# Patient Record
Sex: FEMALE | Race: WHITE | NOT HISPANIC OR LATINO | Employment: OTHER | URBAN - METROPOLITAN AREA
[De-identification: names, ages, dates, MRNs, and addresses within clinical notes are randomized per-mention and may not be internally consistent; named-entity substitution may affect disease eponyms.]

---

## 2016-06-21 LAB
CHOLEST SERPL-MSCNC: 212 MG/DL (ref 0–200)
HDLC SERPL-MCNC: 73 MG/DL
HEP C VIRUS AB: <0.1
LDLC SERPL CALC-MCNC: 120 MG/DL
TRIGL SERPL-MCNC: 94 MG/DL

## 2020-08-19 ENCOUNTER — OFFICE VISIT (OUTPATIENT)
Dept: FAMILY MEDICINE | Facility: CLINIC | Age: 55
End: 2020-08-19
Payer: MEDICARE

## 2020-08-19 VITALS
HEIGHT: 65 IN | TEMPERATURE: 97 F | BODY MASS INDEX: 27.47 KG/M2 | RESPIRATION RATE: 15 BRPM | DIASTOLIC BLOOD PRESSURE: 100 MMHG | SYSTOLIC BLOOD PRESSURE: 161 MMHG | WEIGHT: 164.88 LBS | HEART RATE: 83 BPM | OXYGEN SATURATION: 93 %

## 2020-08-19 DIAGNOSIS — D50.8 OTHER IRON DEFICIENCY ANEMIA: Chronic | ICD-10-CM

## 2020-08-19 DIAGNOSIS — Z11.4 ENCOUNTER FOR SCREENING FOR HIV: ICD-10-CM

## 2020-08-19 DIAGNOSIS — Z11.59 ENCOUNTER FOR HEPATITIS C SCREENING TEST FOR LOW RISK PATIENT: ICD-10-CM

## 2020-08-19 DIAGNOSIS — R06.09 DYSPNEA ON EXERTION: Primary | ICD-10-CM

## 2020-08-19 DIAGNOSIS — I10 ESSENTIAL HYPERTENSION: Chronic | ICD-10-CM

## 2020-08-19 DIAGNOSIS — I48.20 ATRIAL FIBRILLATION, CHRONIC: Chronic | ICD-10-CM

## 2020-08-19 DIAGNOSIS — R06.09 DYSPNEA ON EXERTION: ICD-10-CM

## 2020-08-19 DIAGNOSIS — R10.84 GENERALIZED ABDOMINAL PAIN: Chronic | ICD-10-CM

## 2020-08-19 PROCEDURE — 3008F BODY MASS INDEX DOCD: CPT | Mod: CPTII,S$GLB,, | Performed by: FAMILY MEDICINE

## 2020-08-19 PROCEDURE — 99205 OFFICE O/P NEW HI 60 MIN: CPT | Mod: S$GLB,,, | Performed by: FAMILY MEDICINE

## 2020-08-19 PROCEDURE — 99999 PR PBB SHADOW E&M-NEW PATIENT-LVL IV: ICD-10-PCS | Mod: PBBFAC,,, | Performed by: FAMILY MEDICINE

## 2020-08-19 PROCEDURE — 99205 PR OFFICE/OUTPT VISIT, NEW, LEVL V, 60-74 MIN: ICD-10-PCS | Mod: S$GLB,,, | Performed by: FAMILY MEDICINE

## 2020-08-19 PROCEDURE — 99999 PR PBB SHADOW E&M-NEW PATIENT-LVL IV: CPT | Mod: PBBFAC,,, | Performed by: FAMILY MEDICINE

## 2020-08-19 PROCEDURE — 99354 PR PROLONGED SVC, OUPT, 1ST HR: ICD-10-PCS | Mod: S$GLB,,, | Performed by: FAMILY MEDICINE

## 2020-08-19 PROCEDURE — 99354 PR PROLONGED SVC, OUPT, 1ST HR: CPT | Mod: S$GLB,,, | Performed by: FAMILY MEDICINE

## 2020-08-19 PROCEDURE — 3008F PR BODY MASS INDEX (BMI) DOCUMENTED: ICD-10-PCS | Mod: CPTII,S$GLB,, | Performed by: FAMILY MEDICINE

## 2020-08-19 RX ORDER — CARVEDILOL 6.25 MG/1
6.25 TABLET ORAL 4 TIMES DAILY
COMMUNITY
Start: 2020-02-05 | End: 2020-08-19 | Stop reason: SDUPTHER

## 2020-08-19 RX ORDER — ALPRAZOLAM 1 MG/1
1 TABLET ORAL 4 TIMES DAILY
COMMUNITY
End: 2020-11-06 | Stop reason: DRUGHIGH

## 2020-08-19 RX ORDER — DICLOFENAC SODIUM 10 MG/G
GEL TOPICAL
COMMUNITY
Start: 2020-07-02 | End: 2020-10-26

## 2020-08-19 RX ORDER — ONDANSETRON 4 MG/1
4 TABLET, ORALLY DISINTEGRATING ORAL DAILY
COMMUNITY
Start: 2020-05-14

## 2020-08-19 RX ORDER — RIZATRIPTAN BENZOATE 10 MG/1
10 TABLET, ORALLY DISINTEGRATING ORAL DAILY
COMMUNITY
Start: 2020-02-08 | End: 2020-10-26

## 2020-08-19 RX ORDER — AMLODIPINE BESYLATE 2.5 MG/1
2.5 TABLET ORAL DAILY
Qty: 30 TABLET | Refills: 11 | Status: SHIPPED | OUTPATIENT
Start: 2020-08-19 | End: 2020-10-26

## 2020-08-19 RX ORDER — CARVEDILOL 6.25 MG/1
6.25 TABLET ORAL 2 TIMES DAILY
Qty: 60 TABLET | Refills: 2 | Status: SHIPPED | OUTPATIENT
Start: 2020-08-19 | End: 2021-04-14 | Stop reason: SDUPTHER

## 2020-08-19 NOTE — PROGRESS NOTES
Chief Complaint   Patient presents with    Establish Care         55 y.o. female presenting to clinic for  [facility] . Patient has multiple issues she would like to discuss and have addressed today.    [place] has not had PCP since moving down here.     Abdominal swelling x 9-10 months, along with generalized abdominal discomfort, better in the last month; of note patient has had B/L radical mastectomy, for breast CA, but declined any further treatment, meaning she refused radiation, chemotherapy  Reports a hx of Afib, has noted BP and dyspnea with exercise/exertion,  increasing in the last 3-6 months;   No recent fasting labs;        Review of patient's allergies indicates:   Allergen Reactions    Nsaids (non-steroidal anti-inflammatory drug) Swelling    Peanut Anaphylaxis    Sumatriptan Shortness Of Breath    Perfume Other (See Comments)     Migraines     Latex Rash       Current Outpatient Medications   Medication Sig    ALPRAZolam (XANAX) 1 MG tablet Take 1 mg by mouth 4 (four) times daily.    carvediloL (COREG) 6.25 MG tablet Take 6.25 mg by mouth 4 (four) times daily.    diclofenac sodium (VOLTAREN) 1 % Gel     ketorolac tromethamine (TORADOL INJ) Inject into the muscle.    ondansetron (ZOFRAN-ODT) 4 MG TbDL Take 4 mg by mouth once daily.    rizatriptan (MAXALT-MLT) 10 MG disintegrating tablet Take 10 mg by mouth once daily.     No current facility-administered medications for this visit.        Past Medical History:   Diagnosis Date    Anxiety     Aphasia due to closed TBI (traumatic brain injury)     Arthritis     Asthma     Bladder prolapse, female, acquired     Breast cancer     Cerebrovascular small vessel disease     HTN (hypertension)     IBS (irritable bowel syndrome)     Migraines     Mouth ulcers     Multiple sclerosis     Neoplasm of adipose tissue     Osteoporosis     Sciatica     Scoliosis         Social History     Tobacco  "Use    Smoking status: Never Smoker    Smokeless tobacco: Never Used   Substance Use Topics    Alcohol use: Not Currently    Drug use: Never        Family History   Problem Relation Age of Onset    Multiple sclerosis Mother     Parkinsonism Mother     Multiple sclerosis Maternal Grandmother     Multiple sclerosis Maternal Aunt     Parkinsonism Father     Heart disease Father     Heart disease Son         Review of Systems   Constitutional: Positive for activity change, appetite change, fatigue and unexpected weight change. Negative for fever.   Respiratory: Positive for chest tightness and shortness of breath. Negative for cough.    Cardiovascular: Positive for palpitations and leg swelling. Negative for chest pain.   Gastrointestinal: Positive for abdominal distention, abdominal pain, nausea and vomiting.   Skin: Negative for rash.   Neurological: Positive for dizziness, weakness, light-headedness and headaches. Negative for speech difficulty and numbness.   Psychiatric/Behavioral: Positive for dysphoric mood and sleep disturbance. Negative for agitation, behavioral problems, confusion and decreased concentration. The patient is nervous/anxious.         BP (!) 161/100 (BP Location: Left arm, Patient Position: Sitting, BP Method: Medium (Automatic))   Pulse 83   Temp 97.4 °F (36.3 °C) (Skin)   Resp 15   Ht 5' 4.5" (1.638 m)   Wt 74.8 kg (164 lb 14.5 oz)   SpO2 (!) 93%   BMI 27.87 kg/m²     Physical Exam  Vitals signs (elevated BP) and nursing note reviewed.   Constitutional:       General: She is awake. She is not in acute distress.     Appearance: She is well-developed, well-groomed and overweight. She is not ill-appearing.   HENT:      Head: Normocephalic and atraumatic.      Right Ear: Tympanic membrane, ear canal and external ear normal.      Left Ear: Tympanic membrane, ear canal and external ear normal.   Eyes:      Extraocular Movements: Extraocular movements intact.      Conjunctiva/sclera: " Conjunctivae normal.   Cardiovascular:      Rate and Rhythm: Normal rate.      Heart sounds: Normal heart sounds.   Chest:      Breasts:         Right: Absent.         Left: Absent.   Abdominal:      General: Bowel sounds are normal. There is distension.      Palpations: Abdomen is soft.      Tenderness: There is generalized abdominal tenderness. There is no guarding or rebound.   Neurological:      Mental Status: She is alert and oriented to person, place, and time.   Psychiatric:         Attention and Perception: Attention and perception normal.         Mood and Affect: Mood is anxious. Affect is labile.         Speech: Speech is rapid and pressured and tangential.         Behavior: Behavior normal. Behavior is cooperative.         Thought Content: Thought content normal.         Cognition and Memory: Cognition and memory normal.         Judgment: Judgment normal.            Assessment and Plan      Sasha was seen today for establish care.    Diagnoses and all orders for this visit:    Dyspnea on exertion  Comments:  DDX: uncontrolled Afib, BP, anemia  obtaining ECHO, labs, will tx as results indicate  Orders:  -     CBC auto differential; Future  -     CT Chest With Contrast; Future  -     Brain Natriuretic Peptide; Future  -     Echo Color Flow Doppler? Yes; Future  -     CBC auto differential  -     Brain Natriuretic Peptide    Atrial fibrillation, chronic  Comments:  continuing with Coreg, adding Norvasc  obtaining fasting labs and ECHO    Orders:  -     Lipid Panel; Future  -     Brain Natriuretic Peptide; Future  -     Echo Color Flow Doppler? Yes; Future  -     amLODIPine (NORVASC) 2.5 MG tablet; Take 1 tablet (2.5 mg total) by mouth once daily.  -     carvediloL (COREG) 6.25 MG tablet; Take 1 tablet (6.25 mg total) by mouth 2 (two) times daily.  -     Lipid Panel  -     Brain Natriuretic Peptide    Essential hypertension  Comments:  BP poorly controlled and is possible cause of dyspnea with  exertion  adding Norvasc 2.5mg  instructed to begin checkinin BP at home,   Orders:  -     Comprehensive metabolic panel; Future  -     Lipid Panel; Future  -     Lipid Panel; Future  -     amLODIPine (NORVASC) 2.5 MG tablet; Take 1 tablet (2.5 mg total) by mouth once daily.  -     carvediloL (COREG) 6.25 MG tablet; Take 1 tablet (6.25 mg total) by mouth 2 (two) times daily.  -     Comprehensive metabolic panel  -     Lipid Panel  -     Lipid Panel    Generalized abdominal pain  Comments:  etiology is unclear at this time  checking liver function  obtaining CT abdomen  Orders:  -     Gamma GT; Future  -     CT Abdomen Pelvis W Wo Contrast; Future  -     Gamma GT    Other iron deficiency anemia  Comments:  obtaining Anemia labs including iron panel  will tx as results indicate  Orders:  -     CBC auto differential; Future  -     CBC auto differential    Encounter for hepatitis C screening test for low risk patient  -     Hepatitis C Antibody; Future  -     Cancel: Hepatitis C Antibody; Future  -     Hepatitis C Antibody  -     Hepatitis C Antibody    Encounter for screening for HIV  -     HIV 1/2 Ag/Ab (4th Gen); Future  -     HIV 1/2 Ag/Ab (4th Gen)    Dyspnea on exertion  -     CBC auto differential; Future  -     CT Chest With Contrast; Future  -     Brain Natriuretic Peptide; Future  -     Echo Color Flow Doppler? Yes; Future  -     CBC auto differential  -     Brain Natriuretic Peptide    Other orders  -     Cancel: Lipid Panel; Future         Follow up in about 4 weeks (around 9/16/2020), or if symptoms worsen or fail to improve, for New symptoms.     Total time spent with Patient: 60 Mins  Risks, benefits, and side effects were discussed with the patient. All questions were answered to the fullest satisfaction of the patient, and pt verbalized understanding and agreement to treatment plan. Pt was to call with any new or worsening symptoms, or present to the ER.

## 2020-08-20 ENCOUNTER — LAB VISIT (OUTPATIENT)
Dept: FAMILY MEDICINE | Facility: CLINIC | Age: 55
End: 2020-08-20
Payer: MEDICARE

## 2020-08-20 ENCOUNTER — TELEPHONE (OUTPATIENT)
Dept: FAMILY MEDICINE | Facility: CLINIC | Age: 55
End: 2020-08-20

## 2020-08-20 DIAGNOSIS — Z12.11 COLON CANCER SCREENING: ICD-10-CM

## 2020-08-20 DIAGNOSIS — R50.9 FEVER AND CHILLS: ICD-10-CM

## 2020-08-20 DIAGNOSIS — R50.9 FEVER AND CHILLS: Primary | ICD-10-CM

## 2020-08-20 PROBLEM — R10.84 GENERALIZED ABDOMINAL PAIN: Status: ACTIVE | Noted: 2020-08-20

## 2020-08-20 PROBLEM — D50.9 IRON DEFICIENCY ANEMIA: Status: ACTIVE | Noted: 2020-08-20

## 2020-08-20 PROBLEM — F41.1 GAD (GENERALIZED ANXIETY DISORDER): Status: ACTIVE | Noted: 2020-08-20

## 2020-08-20 PROBLEM — R19.07 ABDOMINAL SWELLING, GENERALIZED: Status: ACTIVE | Noted: 2020-08-20

## 2020-08-20 PROCEDURE — U0003 INFECTIOUS AGENT DETECTION BY NUCLEIC ACID (DNA OR RNA); SEVERE ACUTE RESPIRATORY SYNDROME CORONAVIRUS 2 (SARS-COV-2) (CORONAVIRUS DISEASE [COVID-19]), AMPLIFIED PROBE TECHNIQUE, MAKING USE OF HIGH THROUGHPUT TECHNOLOGIES AS DESCRIBED BY CMS-2020-01-R: HCPCS

## 2020-08-20 NOTE — TELEPHONE ENCOUNTER
Spoke with patient who reports she developed cough, chills, headache and fever last evening and is worried and would like a test for COVID. Her partner is also a  and will not be allowed to go back to work until patient has test done. Test has been ordered and she was instructed to come to clinic before 3PM today to have test done.   Josefa Galvez M.D.      ----- Message from RT Lita sent at 8/20/2020 10:49 AM CDT -----  Pt request covid test   ----- Message -----  From: Laura Oliva  Sent: 8/20/2020  10:42 AM CDT  To: [facility] Staff    Type: Needs Medical Advice    Who Called:  Patient  Best Call Back Number: [phone]  Additional Information:  Patient is experiencing symptoms of Covid, including fever/requesting covid test be ordered/please call back to advise.

## 2020-08-21 LAB — SARS-COV-2 RNA RESP QL NAA+PROBE: NOT DETECTED

## 2020-09-03 ENCOUNTER — OFFICE VISIT (OUTPATIENT)
Dept: FAMILY MEDICINE | Facility: CLINIC | Age: 55
End: 2020-09-03
Payer: MEDICARE

## 2020-09-03 VITALS
RESPIRATION RATE: 15 BRPM | HEIGHT: 65 IN | HEART RATE: 66 BPM | TEMPERATURE: 99 F | BODY MASS INDEX: 27.09 KG/M2 | OXYGEN SATURATION: 95 % | SYSTOLIC BLOOD PRESSURE: 130 MMHG | DIASTOLIC BLOOD PRESSURE: 67 MMHG | WEIGHT: 162.56 LBS

## 2020-09-03 DIAGNOSIS — F33.2 SEVERE RECURRENT MAJOR DEPRESSION WITHOUT PSYCHOTIC FEATURES: Chronic | ICD-10-CM

## 2020-09-03 DIAGNOSIS — K82.8 DYSFUNCTIONAL GALLBLADDER: Chronic | ICD-10-CM

## 2020-09-03 DIAGNOSIS — K59.9 BOWEL DYSFUNCTION: Primary | Chronic | ICD-10-CM

## 2020-09-03 DIAGNOSIS — F41.1 GAD (GENERALIZED ANXIETY DISORDER): Chronic | ICD-10-CM

## 2020-09-03 PROCEDURE — 3725F SCREENING, DEPRESSION: ICD-10-PCS | Mod: S$GLB,,, | Performed by: FAMILY MEDICINE

## 2020-09-03 PROCEDURE — 3725F SCREEN DEPRESSION PERFORMED: CPT | Mod: S$GLB,,, | Performed by: FAMILY MEDICINE

## 2020-09-03 PROCEDURE — 3008F BODY MASS INDEX DOCD: CPT | Mod: CPTII,S$GLB,, | Performed by: FAMILY MEDICINE

## 2020-09-03 PROCEDURE — 96127 PR BRIEF EMOTIONAL/BEHAV ASSMT: ICD-10-PCS | Mod: S$GLB,,, | Performed by: FAMILY MEDICINE

## 2020-09-03 PROCEDURE — 99999 PR PBB SHADOW E&M-EST. PATIENT-LVL V: ICD-10-PCS | Mod: PBBFAC,,, | Performed by: FAMILY MEDICINE

## 2020-09-03 PROCEDURE — 99214 OFFICE O/P EST MOD 30 MIN: CPT | Mod: 25,S$GLB,, | Performed by: FAMILY MEDICINE

## 2020-09-03 PROCEDURE — 99999 PR PBB SHADOW E&M-EST. PATIENT-LVL V: CPT | Mod: PBBFAC,,, | Performed by: FAMILY MEDICINE

## 2020-09-03 PROCEDURE — 99214 PR OFFICE/OUTPT VISIT, EST, LEVL IV, 30-39 MIN: ICD-10-PCS | Mod: 25,S$GLB,, | Performed by: FAMILY MEDICINE

## 2020-09-03 PROCEDURE — 3008F PR BODY MASS INDEX (BMI) DOCUMENTED: ICD-10-PCS | Mod: CPTII,S$GLB,, | Performed by: FAMILY MEDICINE

## 2020-09-03 PROCEDURE — 96127 BRIEF EMOTIONAL/BEHAV ASSMT: CPT | Mod: S$GLB,,, | Performed by: FAMILY MEDICINE

## 2020-09-03 RX ORDER — OMEPRAZOLE 40 MG/1
40 CAPSULE, DELAYED RELEASE ORAL EVERY MORNING
COMMUNITY
Start: 2020-09-02 | End: 2021-11-04 | Stop reason: SDUPTHER

## 2020-09-03 RX ORDER — METOCLOPRAMIDE 5 MG/1
5 TABLET ORAL
Qty: 30 TABLET | Refills: 0 | Status: SHIPPED | OUTPATIENT
Start: 2020-09-03 | End: 2021-04-14 | Stop reason: SDUPTHER

## 2020-09-03 RX ORDER — POTASSIUM CHLORIDE 750 MG/1
TABLET, EXTENDED RELEASE ORAL
COMMUNITY
Start: 2020-09-02 | End: 2020-10-26

## 2020-09-03 NOTE — PROGRESS NOTES
Chief Complaint   Patient presents with    Anxiety    Depression    Abdominal Pain         55 y.o. female presenting to clinic for  depression, anxiety and continued abdominal pain.     54 y/o Female with hx of Breast Cancer, MS, complaining of Chronic RUQ abdominal pain, N/V with food and even some liquids and bowel dysfunction. Reviewed previous tests done at  in July:  US Gallbladder was negative, however HIDA  showed EF of 71%.     Also has significant hx of depression and anxiety, has been on multiple antidepressants, antipsychotics, anxiolytics; the biggest source of her depression is her current health problems, particularly the abdominal swelling and her difficulty with eating. She is not interested any more medication at this time.      Review of patient's allergies indicates:   Allergen Reactions    Nsaids (non-steroidal anti-inflammatory drug) Swelling    Peanut Anaphylaxis    Sumatriptan Shortness Of Breath    Perfume Other (See Comments)     Migraines     Latex Rash       Current Outpatient Medications   Medication Sig    ALPRAZolam (XANAX) 1 MG tablet Take 1 mg by mouth 4 (four) times daily.    amLODIPine (NORVASC) 2.5 MG tablet Take 1 tablet (2.5 mg total) by mouth once daily.    carvediloL (COREG) 6.25 MG tablet Take 1 tablet (6.25 mg total) by mouth 2 (two) times daily.    diclofenac sodium (VOLTAREN) 1 % Gel     ketorolac tromethamine (TORADOL INJ) Inject into the muscle.    omeprazole (PRILOSEC) 40 MG capsule     ondansetron (ZOFRAN-ODT) 4 MG TbDL Take 4 mg by mouth once daily.    potassium chloride (KLOR-CON) 10 MEQ TbSR     rizatriptan (MAXALT-MLT) 10 MG disintegrating tablet Take 10 mg by mouth once daily.     No current facility-administered medications for this visit.        Past Medical History:   Diagnosis Date    Anxiety     Aphasia due to closed TBI (traumatic brain injury)     Arthritis     Asthma     Bladder prolapse, female, acquired     Breast cancer      "Cerebrovascular small vessel disease     HTN (hypertension)     IBS (irritable bowel syndrome)     Migraines     Mouth ulcers     Multiple sclerosis     Neoplasm of adipose tissue     Osteoporosis     Sciatica     Scoliosis         Social History     Tobacco Use    Smoking status: Never Smoker    Smokeless tobacco: Never Used   Substance Use Topics    Alcohol use: Not Currently    Drug use: Never        Family History   Problem Relation Age of Onset    Multiple sclerosis Mother     Parkinsonism Mother     Multiple sclerosis Maternal Grandmother     Multiple sclerosis Maternal Aunt     Parkinsonism Father     Heart disease Father     Heart disease Son       MICHELLE-7 Score: 11  Interpretation: Moderate Anxiety  PHQ9 9/3/2020   Total Score 16         Review of Systems   Constitutional: Positive for activity change, appetite change and fatigue.   Respiratory: Negative for choking.    Cardiovascular: Negative for chest pain.   Gastrointestinal: Positive for abdominal distention, abdominal pain, constipation, diarrhea, nausea and vomiting.   Musculoskeletal: Positive for myalgias.   Allergic/Immunologic: Positive for immunocompromised state.   Neurological: Positive for dizziness, speech difficulty, light-headedness and headaches.        Chronic asphasia   Psychiatric/Behavioral: Positive for confusion, decreased concentration, dysphoric mood and sleep disturbance. Negative for self-injury. The patient is nervous/anxious.         /67 (BP Location: Right arm, Patient Position: Sitting)   Pulse 66   Temp 98.6 °F (37 °C)   Resp 15   Ht 5' 4.5" (1.638 m)   Wt 73.8 kg (162 lb 9.4 oz)   SpO2 95%   BMI 27.48 kg/m²     Physical Exam  Vitals signs and nursing note reviewed.   Constitutional:       General: She is not in acute distress.     Appearance: Normal appearance. She is not ill-appearing.   HENT:      Head: Normocephalic and atraumatic.      Mouth/Throat:      Mouth: Mucous membranes are dry. "   Cardiovascular:      Rate and Rhythm: Normal rate and regular rhythm.      Heart sounds: Murmur present.   Pulmonary:      Effort: Pulmonary effort is normal.      Breath sounds: Normal breath sounds. No decreased air movement or transmitted upper airway sounds. No decreased breath sounds, wheezing or rhonchi.   Abdominal:      General: Abdomen is protuberant. Bowel sounds are decreased. There is distension.      Palpations: Abdomen is soft.      Tenderness: There is abdominal tenderness in the right upper quadrant and epigastric area. Positive signs include Dugan's sign.   Musculoskeletal:      Right lower leg: No edema.      Left lower leg: No edema.   Neurological:      Mental Status: She is alert and oriented to person, place, and time.   Psychiatric:         Attention and Perception: Attention and perception normal.         Mood and Affect: Mood is anxious. Affect is labile.         Speech: Speech normal.         Behavior: Behavior normal. Behavior is cooperative.         Thought Content: Thought content normal.         Cognition and Memory: Cognition is impaired. Memory is impaired.         Judgment: Judgment normal.            Assessment and Plan      Sasha was seen today for anxiety, depression and abdominal pain.    Diagnoses and all orders for this visit:    Bowel dysfunction  Comments:  possibly 2/2 MS vs gallbladder disfunction  beginning on Reglan to stimulate peristalsis   f/u 1 month or sooner if needed  Orders:  -     metoclopramide HCl (REGLAN) 5 MG tablet; Take 1 tablet (5 mg total) by mouth 3 (three) times daily before meals.  -     Cancel: Ambulatory referral/consult to General Surgery; Future  -     Cancel: Ambulatory referral/consult to General Surgery; Future  -     Ambulatory referral/consult to General Surgery; Future    Dysfunctional gallbladder  Comments:  referring to Gen Surg for further evaluation and treatment  Orders:  -     Cancel: Ambulatory referral/consult to General Surgery;  Future  -     Cancel: Ambulatory referral/consult to General Surgery; Future  -     Ambulatory referral/consult to General Surgery; Future    Severe recurrent major depression without psychotic features  Comments:  depression and anxiety likely 2/2  MS and hx of multiple TS  best tx: referral to neuropsych & meds  Pt declines at this time; wants to wait till after GS eval    MICHELLE (generalized anxiety disorder)  Comments:  depression and anxiety likely 2/2  MS and hx of multiple TS  best tx: referral to neuropsych & meds  Pt declines at this time; wants to wait till after GS eval    Other orders  -     Cancel: H. pylori antigen, stool; Future  -     Cancel: Occult blood x 1, stool; Future  -     Cancel: WBC, Stool; Future  -     Cancel: Giardia / Cryptosporidum, EIA; Future  -     Cancel: Stool Exam-Ova,Cysts,Parasites; Future  -     Cancel: Clostridium difficile EIA; Future  -     Cancel: Stool culture; Future         Follow up in about 4 weeks (around 10/1/2020), or if symptoms worsen or fail to improve, for New symptoms.     Risks, benefits, and side effects were discussed with the patient. All questions were answered to the fullest satisfaction of the patient, and pt verbalized understanding and agreement to treatment plan. Pt was to call with any new or worsening symptoms, or present to the ER.

## 2020-09-05 PROBLEM — F33.2 SEVERE RECURRENT MAJOR DEPRESSION WITHOUT PSYCHOTIC FEATURES: Status: ACTIVE | Noted: 2020-09-05

## 2020-09-17 ENCOUNTER — PATIENT MESSAGE (OUTPATIENT)
Dept: FAMILY MEDICINE | Facility: CLINIC | Age: 55
End: 2020-09-17

## 2020-10-05 ENCOUNTER — PATIENT MESSAGE (OUTPATIENT)
Dept: ADMINISTRATIVE | Facility: HOSPITAL | Age: 55
End: 2020-10-05

## 2020-10-22 PROBLEM — I10 ESSENTIAL HYPERTENSION: Status: ACTIVE | Noted: 2020-10-22

## 2020-10-26 ENCOUNTER — OFFICE VISIT (OUTPATIENT)
Dept: FAMILY MEDICINE | Facility: CLINIC | Age: 55
End: 2020-10-26
Payer: MEDICARE

## 2020-10-26 VITALS
BODY MASS INDEX: 27.66 KG/M2 | RESPIRATION RATE: 16 BRPM | WEIGHT: 166 LBS | HEIGHT: 65 IN | SYSTOLIC BLOOD PRESSURE: 151 MMHG | HEART RATE: 83 BPM | OXYGEN SATURATION: 98 % | DIASTOLIC BLOOD PRESSURE: 93 MMHG | TEMPERATURE: 98 F

## 2020-10-26 DIAGNOSIS — G35 MULTIPLE SCLEROSIS: Primary | ICD-10-CM

## 2020-10-26 DIAGNOSIS — B00.1 RECURRENT COLD SORES: ICD-10-CM

## 2020-10-26 DIAGNOSIS — F41.1 GAD (GENERALIZED ANXIETY DISORDER): ICD-10-CM

## 2020-10-26 PROCEDURE — 99214 OFFICE O/P EST MOD 30 MIN: CPT | Mod: S$GLB,,, | Performed by: FAMILY MEDICINE

## 2020-10-26 PROCEDURE — 99214 PR OFFICE/OUTPT VISIT, EST, LEVL IV, 30-39 MIN: ICD-10-PCS | Mod: S$GLB,,, | Performed by: FAMILY MEDICINE

## 2020-10-26 PROCEDURE — 99999 PR PBB SHADOW E&M-EST. PATIENT-LVL V: CPT | Mod: PBBFAC,,, | Performed by: FAMILY MEDICINE

## 2020-10-26 PROCEDURE — 1125F AMNT PAIN NOTED PAIN PRSNT: CPT | Mod: S$GLB,,, | Performed by: FAMILY MEDICINE

## 2020-10-26 PROCEDURE — 3008F BODY MASS INDEX DOCD: CPT | Mod: CPTII,S$GLB,, | Performed by: FAMILY MEDICINE

## 2020-10-26 PROCEDURE — 99999 PR PBB SHADOW E&M-EST. PATIENT-LVL V: ICD-10-PCS | Mod: PBBFAC,,, | Performed by: FAMILY MEDICINE

## 2020-10-26 PROCEDURE — 3008F PR BODY MASS INDEX (BMI) DOCUMENTED: ICD-10-PCS | Mod: CPTII,S$GLB,, | Performed by: FAMILY MEDICINE

## 2020-10-26 PROCEDURE — 1125F PR PAIN SEVERITY QUANTIFIED, PAIN PRESENT: ICD-10-PCS | Mod: S$GLB,,, | Performed by: FAMILY MEDICINE

## 2020-10-26 RX ORDER — CALCIUM CARB/VITAMIN D3/VIT K1 500-500-40
400 TABLET,CHEWABLE ORAL DAILY
COMMUNITY

## 2020-10-26 RX ORDER — SOY ISOFL/BLK COH/GR TEA/YERBA 56-40-130
1 TABLET ORAL DAILY
COMMUNITY

## 2020-10-26 RX ORDER — UBIDECARENONE 75 MG
500 CAPSULE ORAL DAILY
COMMUNITY

## 2020-10-26 RX ORDER — VITAMIN E 268 MG
400 CAPSULE ORAL DAILY
COMMUNITY

## 2020-10-26 RX ORDER — KETOROLAC TROMETHAMINE 30 MG/ML
30 INJECTION, SOLUTION INTRAMUSCULAR; INTRAVENOUS EVERY 6 HOURS PRN
Qty: 30 ML | Refills: 3 | Status: SHIPPED | OUTPATIENT
Start: 2020-10-26 | End: 2021-04-14 | Stop reason: SDUPTHER

## 2020-10-26 RX ORDER — GARLIC 1000 MG
1 CAPSULE ORAL DAILY
COMMUNITY

## 2020-10-26 RX ORDER — PYRIDOXINE HCL (VITAMIN B6) 100 MG
50 TABLET ORAL DAILY
COMMUNITY

## 2020-10-26 RX ORDER — VITAMIN K2 40 MCG
500 TABLET ORAL DAILY
COMMUNITY

## 2020-10-26 RX ORDER — ALFALFA 250 MG
433 TABLET ORAL DAILY
COMMUNITY

## 2020-10-26 RX ORDER — IVERMECTIN 3 MG/1
15 TABLET ORAL WEEKLY
Qty: 20 TABLET | Refills: 2 | Status: SHIPPED | OUTPATIENT
Start: 2020-10-26 | End: 2021-01-24

## 2020-10-26 RX ORDER — FLAXSEED OIL 1000 MG
1 CAPSULE ORAL DAILY
COMMUNITY

## 2020-10-26 RX ORDER — VALACYCLOVIR HYDROCHLORIDE 1 G/1
1000 TABLET, FILM COATED ORAL 3 TIMES DAILY
Qty: 30 TABLET | Refills: 3 | Status: SHIPPED | OUTPATIENT
Start: 2020-10-26 | End: 2020-11-05

## 2020-10-26 RX ORDER — ZINC GLUCONATE 50 MG
50 TABLET ORAL DAILY
COMMUNITY

## 2020-10-26 RX ORDER — NIACIN 500 MG
250 CAPSULE, EXTENDED RELEASE ORAL NIGHTLY
COMMUNITY

## 2020-10-26 RX ORDER — MAGNESIUM 200 MG
1 TABLET ORAL DAILY
COMMUNITY

## 2020-10-26 RX ORDER — MV-MIN/VIT C/GLUT/LYSINE/HB124 250-12.5MG
1 TABLET,CHEWABLE ORAL DAILY
COMMUNITY

## 2020-10-26 RX ORDER — MULTIVIT-MIN/FOLIC ACID/LUTEIN 400-250MCG
1 TABLET,CHEWABLE ORAL DAILY
COMMUNITY

## 2020-10-26 RX ORDER — AMOXICILLIN 500 MG
1 CAPSULE ORAL DAILY
COMMUNITY

## 2020-10-26 RX ORDER — RIZATRIPTAN BENZOATE 10 MG/1
10 TABLET, ORALLY DISINTEGRATING ORAL
Qty: 18 TABLET | Refills: 1 | Status: SHIPPED | OUTPATIENT
Start: 2020-10-26 | End: 2021-04-14 | Stop reason: SDUPTHER

## 2020-10-27 ENCOUNTER — PATIENT MESSAGE (OUTPATIENT)
Dept: FAMILY MEDICINE | Facility: CLINIC | Age: 55
End: 2020-10-27

## 2020-10-30 ENCOUNTER — PATIENT MESSAGE (OUTPATIENT)
Dept: ADMINISTRATIVE | Facility: HOSPITAL | Age: 55
End: 2020-10-30

## 2020-11-03 ENCOUNTER — PATIENT MESSAGE (OUTPATIENT)
Dept: FAMILY MEDICINE | Facility: CLINIC | Age: 55
End: 2020-11-03

## 2020-11-06 RX ORDER — ALPRAZOLAM 2 MG/1
TABLET ORAL
Qty: 90 TABLET | Refills: 0 | Status: SHIPPED | OUTPATIENT
Start: 2020-11-06 | End: 2021-04-14 | Stop reason: SDUPTHER

## 2020-11-23 ENCOUNTER — TELEPHONE (OUTPATIENT)
Dept: FAMILY MEDICINE | Facility: CLINIC | Age: 55
End: 2020-11-23

## 2020-11-23 ENCOUNTER — PATIENT MESSAGE (OUTPATIENT)
Dept: FAMILY MEDICINE | Facility: CLINIC | Age: 55
End: 2020-11-23

## 2020-11-23 NOTE — TELEPHONE ENCOUNTER
----- Message from Lian  sent at 11/23/2020 12:54 PM CST -----  Regarding: same day  Contact: pt  Type: Needs Medical Advice    Who Called:      Best Call Back Number:     Additional Information: Requesting a call back from Nurse, regarding pt was on VV for 45 mins and needs for nurse to call her back ,please call back ASAP

## 2020-11-23 NOTE — TELEPHONE ENCOUNTER
Called patient back. I rescheduled her visit but she could not find it on her [employer] [facility].  She stated that the Metoclopramide Hcl is working well and she has no new changes or problems. She wanted to let the provider know. She will call us if she has any other questions or concerns.

## 2020-11-28 ENCOUNTER — LAB VISIT (OUTPATIENT)
Dept: LAB | Facility: HOSPITAL | Age: 55
End: 2020-11-28
Attending: FAMILY MEDICINE
Payer: MEDICARE

## 2020-11-28 DIAGNOSIS — Z12.11 COLON CANCER SCREENING: ICD-10-CM

## 2020-11-28 PROCEDURE — 82274 ASSAY TEST FOR BLOOD FECAL: CPT

## 2020-12-11 LAB — HEMOCCULT STL QL IA: NEGATIVE

## 2021-01-28 ENCOUNTER — PATIENT OUTREACH (OUTPATIENT)
Dept: ADMINISTRATIVE | Facility: HOSPITAL | Age: 56
End: 2021-01-28

## 2021-02-18 ENCOUNTER — PATIENT OUTREACH (OUTPATIENT)
Dept: ADMINISTRATIVE | Facility: HOSPITAL | Age: 56
End: 2021-02-18

## 2021-03-17 ENCOUNTER — PATIENT OUTREACH (OUTPATIENT)
Dept: ADMINISTRATIVE | Facility: HOSPITAL | Age: 56
End: 2021-03-17

## 2021-03-29 ENCOUNTER — PATIENT OUTREACH (OUTPATIENT)
Dept: ADMINISTRATIVE | Facility: HOSPITAL | Age: 56
End: 2021-03-29

## 2021-04-14 ENCOUNTER — PATIENT MESSAGE (OUTPATIENT)
Dept: FAMILY MEDICINE | Facility: CLINIC | Age: 56
End: 2021-04-14

## 2021-04-14 DIAGNOSIS — F41.1 GAD (GENERALIZED ANXIETY DISORDER): ICD-10-CM

## 2021-04-14 DIAGNOSIS — I48.20 ATRIAL FIBRILLATION, CHRONIC: Chronic | ICD-10-CM

## 2021-04-14 DIAGNOSIS — I10 ESSENTIAL HYPERTENSION: Chronic | ICD-10-CM

## 2021-04-14 DIAGNOSIS — K59.9 BOWEL DYSFUNCTION: Chronic | ICD-10-CM

## 2021-04-14 DIAGNOSIS — G35 MULTIPLE SCLEROSIS: ICD-10-CM

## 2021-04-14 RX ORDER — CARVEDILOL 6.25 MG/1
6.25 TABLET ORAL 2 TIMES DAILY
Qty: 60 TABLET | Refills: 2 | Status: SHIPPED | OUTPATIENT
Start: 2021-04-14 | End: 2021-11-04 | Stop reason: SDUPTHER

## 2021-04-14 RX ORDER — RIZATRIPTAN BENZOATE 10 MG/1
10 TABLET, ORALLY DISINTEGRATING ORAL
Qty: 18 TABLET | Refills: 1 | Status: SHIPPED | OUTPATIENT
Start: 2021-04-14 | End: 2021-11-04 | Stop reason: SDUPTHER

## 2021-04-14 RX ORDER — KETOROLAC TROMETHAMINE 30 MG/ML
30 INJECTION, SOLUTION INTRAMUSCULAR; INTRAVENOUS EVERY 6 HOURS PRN
Qty: 30 ML | Refills: 3 | Status: SHIPPED | OUTPATIENT
Start: 2021-04-14 | End: 2022-04-14

## 2021-04-14 RX ORDER — ALPRAZOLAM 2 MG/1
TABLET ORAL
Qty: 90 TABLET | Refills: 0 | Status: SHIPPED | OUTPATIENT
Start: 2021-04-14 | End: 2021-11-04 | Stop reason: SDUPTHER

## 2021-04-14 RX ORDER — METOCLOPRAMIDE 5 MG/1
5 TABLET ORAL
Qty: 30 TABLET | Refills: 0 | Status: SHIPPED | OUTPATIENT
Start: 2021-04-14

## 2021-04-15 ENCOUNTER — PATIENT MESSAGE (OUTPATIENT)
Dept: FAMILY MEDICINE | Facility: CLINIC | Age: 56
End: 2021-04-15

## 2021-04-17 ENCOUNTER — PATIENT MESSAGE (OUTPATIENT)
Dept: FAMILY MEDICINE | Facility: CLINIC | Age: 56
End: 2021-04-17

## 2021-04-17 DIAGNOSIS — F41.1 GAD (GENERALIZED ANXIETY DISORDER): ICD-10-CM

## 2021-04-20 RX ORDER — ALPRAZOLAM 2 MG/1
TABLET ORAL
Qty: 90 TABLET | Refills: 0 | Status: CANCELLED | OUTPATIENT
Start: 2021-04-20 | End: 2022-04-20

## 2021-05-19 ENCOUNTER — PATIENT MESSAGE (OUTPATIENT)
Dept: FAMILY MEDICINE | Facility: CLINIC | Age: 56
End: 2021-05-19

## 2021-05-19 DIAGNOSIS — K82.8 DYSFUNCTIONAL GALLBLADDER: Primary | ICD-10-CM

## 2021-05-19 RX ORDER — DICYCLOMINE HYDROCHLORIDE 20 MG/1
20 TABLET ORAL EVERY 6 HOURS
Qty: 120 TABLET | Refills: 0 | Status: SHIPPED | OUTPATIENT
Start: 2021-05-19 | End: 2021-06-18

## 2021-07-07 ENCOUNTER — TELEPHONE (OUTPATIENT)
Dept: FAMILY MEDICINE | Facility: CLINIC | Age: 56
End: 2021-07-07

## 2021-07-08 ENCOUNTER — PATIENT MESSAGE (OUTPATIENT)
Dept: FAMILY MEDICINE | Facility: CLINIC | Age: 56
End: 2021-07-08

## 2021-07-08 ENCOUNTER — TELEPHONE (OUTPATIENT)
Dept: FAMILY MEDICINE | Facility: CLINIC | Age: 56
End: 2021-07-08

## 2021-09-30 ENCOUNTER — PATIENT MESSAGE (OUTPATIENT)
Dept: FAMILY MEDICINE | Facility: CLINIC | Age: 56
End: 2021-09-30

## 2021-09-30 ENCOUNTER — PATIENT OUTREACH (OUTPATIENT)
Dept: FAMILY MEDICINE | Facility: CLINIC | Age: 56
End: 2021-09-30

## 2021-10-05 ENCOUNTER — TELEPHONE (OUTPATIENT)
Dept: FAMILY MEDICINE | Facility: CLINIC | Age: 56
End: 2021-10-05

## 2021-10-25 ENCOUNTER — TELEPHONE (OUTPATIENT)
Dept: FAMILY MEDICINE | Facility: CLINIC | Age: 56
End: 2021-10-25
Payer: MEDICARE

## 2021-11-02 NOTE — PROGRESS NOTES
Chief Complaint   Patient presents with    Follow-up        The patient location is: home  The chief complaint leading to consultation is:   Chief Complaint   Patient presents with    Follow-up       Visit type: Virtual visit with synchronous audio and video           Each patient to whom I provide medical services by telemedicine is:  (1) informed of the relationship between the physician and patient and the respective role of any other health care provider with respect to management of the patient; and (2) notified that they may decline to receive medical services by telemedicine and may withdraw from such care at any time. Patient verbally consented to receive this service via  synchronous audio and video              Patient is here today for follow up:  Current complaints/concerns:  *HTN-BP at home has been elevated  *medication refills  *needs lymphedema therapy  *        Review of patient's allergies indicates:   Allergen Reactions    Nsaids (non-steroidal anti-inflammatory drug) Swelling    Peanut Anaphylaxis    Sumatriptan Shortness Of Breath    Ipratropium analogues Other (See Comments) and Photosensitivity    Perfume Other (See Comments)     Migraines     Latex Rash      Outpatient Medications as of 11/4/2021   Medication Sig Dispense Refill    alfalfa 250 mg Tab Take 433 mg by mouth once daily.      arginine 500 mg tablet Take 500 mg by mouth once daily.      carvediloL (COREG) 6.25 MG tablet Take 1 tablet (6.25 mg total) by mouth 2 (two) times daily. 60 tablet 2    cholecalciferol, vitamin D3, (VITAMIN D3) 10 mcg (400 unit) Cap Take 400 Units by mouth once daily.      cyanocobalamin (VITAMIN B-12) 500 MCG tablet Take 500 mcg by mouth once daily.      flaxseed oil 1,000 mg Cap Take 1 capsule by mouth once daily.      garlic 1,000 mg Cap Take 1 tablet by mouth once daily.      ginkgo biloba 60 mg Cap Take 1 tablet by mouth once daily.      GUM JHEFFJ-KYOZPX-IYKN-ALCOHOL MISC Take 500 mg  by mouth once daily.      HONEY ORAL Take 1 spray by mouth once daily.      ketorolac (TORADOL) 30 mg/mL (1 mL) injection Inject 1 mL (30 mg total) into the vein every 6 (six) hours as needed (pain). 30 mL 3    L.acid,gas,plan,rhm/B.ani/cran (UP4 PROBIOTICS WOMEN'S ORAL) Take 1 tablet by mouth once daily.      LICORICE ROOT EXTRACT, BULK, MISC Take 1 tablet by mouth once daily.      magnesium 200 mg Tab Take 1 tablet by mouth once daily.      MAGNESIUM ORAL Take 500 mg by mouth once a week.      metoclopramide HCl (REGLAN) 5 MG tablet Take 1 tablet (5 mg total) by mouth 3 (three) times daily before meals. 30 tablet 0    multivit-min/folic acid/biotin (HAIR,SKIN AND NAILS,FA-BIOTIN, ORAL) Take 3,000 mcg by mouth once daily.      mv-min-folic acid-lutein (CENTRUM SILVER) 400-250 mcg Chew Take 1 tablet by mouth once daily.      mv-mn-vitC-ffrLf-Tqm-Nzr-hc124 (AIRBORNE, ASCORBATE SODIUM,) 333-1.7 mg Chew Take 1 tablet by mouth once daily.      niacin 500 MG CpSR Take 250 mg by mouth every evening.      omega-3 fatty acids/fish oil (FISH OIL-OMEGA-3 FATTY ACIDS) 300-1,000 mg capsule Take 1 capsule by mouth once daily.      ondansetron (ZOFRAN-ODT) 4 MG TbDL Take 4 mg by mouth once daily.      Panax, American ginsg/B12/royl (GINSENG COMPLEX ORAL) Take 1 tablet by mouth once daily.      potassium 99 mg Tab Take 1 tablet by mouth once daily.      pyridoxine, vitamin B6, (VITAMIN B-6) 100 MG Tab Take 50 mg by mouth once daily.      royal jelly-bee pollen-ginseng -250 mg Cap Take 1 tablet by mouth once daily.      soy isofla/blk cohosh/mag bark (ESTROVEN ORAL) Take 1 tablet by mouth once daily.      soy-blk cohosh-green tea-yerba (ESTROVEN ENERGY) 56- mg Tab Take 1 tablet by mouth once daily.      VALERIAN ROOT ORAL Take 880 mg by mouth once daily.      vitamin E 400 UNIT capsule Take 400 Units by mouth once daily.      zinc gluconate 50 mg tablet Take 50 mg by mouth once daily.       No  current facility-administered medications on file as of 11/4/2021.      Past Medical History:   Diagnosis Date    Anxiety     Aphasia due to closed TBI (traumatic brain injury)     Arthritis     Asthma     Bladder prolapse, female, acquired     Breast cancer     Cerebrovascular small vessel disease     HTN (hypertension)     IBS (irritable bowel syndrome)     Migraines     Mouth ulcers     Multiple sclerosis     Neoplasm of adipose tissue     Osteoporosis     Sciatica     Scoliosis      Past Surgical History:   Procedure Laterality Date    BILATERAL MASTECTOMY      TOTAL ABDOMINAL HYSTERECTOMY W/ BILATERAL SALPINGOOPHORECTOMY      partial      Social History     Tobacco Use    Smoking status: Never Smoker    Smokeless tobacco: Never Used   Substance Use Topics    Alcohol use: Not Currently    Drug use: Never       Review of Systems   HENT: Negative for hearing loss.    Eyes: Positive for discharge.   Respiratory: Negative for wheezing.    Cardiovascular: Positive for chest pain and palpitations.   Gastrointestinal: Negative for blood in stool, constipation, diarrhea and vomiting.        Chest and Abdominal swelling   Genitourinary: Negative for dysuria and hematuria.   Musculoskeletal: Positive for neck pain.   Neurological: Positive for weakness and headaches.   Endo/Heme/Allergies: Negative for polydipsia.   Answers for HPI/ROS submitted by the patient on 11/2/2021  activity change: Yes  unexpected weight change: Yes  rhinorrhea: Yes  trouble swallowing: Yes  visual disturbance: Yes  chest tightness: Yes  polyuria: Yes  difficulty urinating: No  menstrual problem: No  joint swelling: Yes  arthralgias: Yes  confusion: No  dysphoric mood: No      No Vitals available as patient not present in clinic           REPORT: reviewed, noted below    Sasha Hensley, 56FContact [employer] Support  YOB: 1965  Recent Address:  View Linked Records (1)  [place]  RX Summary  Summary  Total  Prescriptions 4  Total Private Pay 0  Total Prescribers 2  Total Pharmacies 1  Opioids* (excluding Buprenorphine)  Current Qty 0  Current MME/day 0.00  30 Day Avg MME/day 0.00  Buprenorphine*  Current Qty 0  Current mg/day 0.00  30 Day Avg mg/day 0.00  State Indicators (0)  Details  Prescriptions  Total: 4  Private Pay: 0  Showing 1-4 of 4 Items View 15 Items  1 of 1   Filled  Written  ID  Drug  QTY  Days  Prescriber  RX #  Dispenser  Refill  Daily Dose*  Pymt Type     06/02/2021 04/14/2021 1   Alprazolam 2 Mg Tablet  30.00 15 Ay Bec [id number] Bac (5414) 1 8.00 LME Medicare [place]  04/26/2021 04/14/2021 1   Alprazolam 2 Mg Tablet  60.00 30 Ay Bec [id number] Bac (5414) 0 8.00 LME Medicare [place]  02/14/2020 12/19/2019 1   Oxycodone Hcl 20 Mg Tablet  120.00 30 Ch Gla [id number] Bac (5414) 0 120.00 MME Medicare [place]  01/14/2020 12/19/2019 1   Oxycodone Hcl 20 Mg Tablet  120.00 30 Ch Gla [id number] Bac (5414) 0 120.00 MME Medicare MS  Showing 1-4 of 4 Items View 15 Items  1 of 1   Providers  Total: 2  Showing 1-2 of 2 Items View 15 Items  1 of 1   Name  Address  [facility]  State  Zipcode  Phone   Daljit Acosta MD [address] [address] [phone]  Josefa Galvez [address] [place] [address] [phone]  Showing 1-2 of 2 Items View 15 Items  1 of 1         ASSESSMENT and PLAN    Sasha was seen today for follow-up.    Diagnoses and all orders for this visit:    Lymphedema syndrome, postmastectomy  -     dexAMETHasone (DECADRON) 4 MG Tab; Take 1 tablet (4 mg total) by mouth every 12 (twelve) hours. for 10 days  -     Ambulatory referral/consult to Physical/Occupational Therapy; Future    MICHELLE (generalized anxiety disorder)  -     ALPRAZolam (XANAX) 2 MG Tab; 1/2 tab PO qid prn anxiety    Chronic migraine w/o aura w/o status migrainosus, not intractable  -     rizatriptan (MAXALT-MLT) 10 MG disintegrating tablet; Take 1 tablet (10 mg total) by mouth as needed for Migraine. May repeat in 2 hours if  needed    Seasonal allergies  -     azelastine (ASTELIN) 137 mcg (0.1 %) nasal spray; 2 sprays (274 mcg total) by Nasal route 2 (two) times daily.    Gastroesophageal reflux disease, unspecified whether esophagitis present  -     omeprazole (PRILOSEC) 40 MG capsule; Take 1 capsule (40 mg total) by mouth 2 (two) times daily before meals.            Follow up for New symptoms, Worsening symptoms.      Total time spent: 40+  minutes    Available Notes, Procedures and Results, including Labs/Imaging, from the last 3 months were reviewed.    Risks, benefits, and side effects were discussed with the patient. All questions were answered to the fullest satisfaction of the patient, and pt verbalized understanding and agreement to treatment plan. Pt was to call with any new or worsening symptoms, or present to the ER.              Josefa Galvez M.D.  Family Medicine Physician  [facility]

## 2021-11-04 ENCOUNTER — OFFICE VISIT (OUTPATIENT)
Dept: FAMILY MEDICINE | Facility: CLINIC | Age: 56
End: 2021-11-04
Payer: MEDICARE

## 2021-11-04 DIAGNOSIS — I10 ESSENTIAL HYPERTENSION: Chronic | ICD-10-CM

## 2021-11-04 DIAGNOSIS — J30.2 SEASONAL ALLERGIES: ICD-10-CM

## 2021-11-04 DIAGNOSIS — I97.2 LYMPHEDEMA SYNDROME, POSTMASTECTOMY: Primary | Chronic | ICD-10-CM

## 2021-11-04 DIAGNOSIS — K21.9 GASTROESOPHAGEAL REFLUX DISEASE, UNSPECIFIED WHETHER ESOPHAGITIS PRESENT: ICD-10-CM

## 2021-11-04 DIAGNOSIS — G43.709 CHRONIC MIGRAINE W/O AURA W/O STATUS MIGRAINOSUS, NOT INTRACTABLE: ICD-10-CM

## 2021-11-04 DIAGNOSIS — I48.20 ATRIAL FIBRILLATION, CHRONIC: Chronic | ICD-10-CM

## 2021-11-04 DIAGNOSIS — F41.1 GAD (GENERALIZED ANXIETY DISORDER): ICD-10-CM

## 2021-11-04 PROCEDURE — 1159F MED LIST DOCD IN RCRD: CPT | Mod: CPTII,95,, | Performed by: FAMILY MEDICINE

## 2021-11-04 PROCEDURE — 99215 PR OFFICE/OUTPT VISIT, EST, LEVL V, 40-54 MIN: ICD-10-PCS | Mod: 95,,, | Performed by: FAMILY MEDICINE

## 2021-11-04 PROCEDURE — 1160F PR REVIEW ALL MEDS BY PRESCRIBER/CLIN PHARMACIST DOCUMENTED: ICD-10-PCS | Mod: CPTII,95,, | Performed by: FAMILY MEDICINE

## 2021-11-04 PROCEDURE — 99215 OFFICE O/P EST HI 40 MIN: CPT | Mod: 95,,, | Performed by: FAMILY MEDICINE

## 2021-11-04 PROCEDURE — 1159F PR MEDICATION LIST DOCUMENTED IN MEDICAL RECORD: ICD-10-PCS | Mod: CPTII,95,, | Performed by: FAMILY MEDICINE

## 2021-11-04 PROCEDURE — 1160F RVW MEDS BY RX/DR IN RCRD: CPT | Mod: CPTII,95,, | Performed by: FAMILY MEDICINE

## 2021-11-04 RX ORDER — OMEPRAZOLE 40 MG/1
40 CAPSULE, DELAYED RELEASE ORAL
Qty: 180 CAPSULE | Refills: 3 | Status: SHIPPED | OUTPATIENT
Start: 2021-11-04 | End: 2022-11-04

## 2021-11-04 RX ORDER — ALPRAZOLAM 2 MG/1
TABLET ORAL
Qty: 90 TABLET | Refills: 2 | Status: SHIPPED | OUTPATIENT
Start: 2021-11-04 | End: 2022-06-24 | Stop reason: SDUPTHER

## 2021-11-04 RX ORDER — RIZATRIPTAN BENZOATE 10 MG/1
10 TABLET, ORALLY DISINTEGRATING ORAL
Qty: 18 TABLET | Refills: 1 | Status: SHIPPED | OUTPATIENT
Start: 2021-11-04 | End: 2022-06-24 | Stop reason: SDUPTHER

## 2021-11-04 RX ORDER — DEXAMETHASONE 4 MG/1
4 TABLET ORAL EVERY 12 HOURS
Qty: 20 TABLET | Refills: 1 | Status: SHIPPED | OUTPATIENT
Start: 2021-11-04 | End: 2021-11-14

## 2021-11-04 RX ORDER — AZELASTINE 1 MG/ML
2 SPRAY, METERED NASAL 2 TIMES DAILY
Qty: 60 ML | Refills: 3 | Status: SHIPPED | OUTPATIENT
Start: 2021-11-04 | End: 2023-10-12

## 2021-11-05 RX ORDER — CARVEDILOL 6.25 MG/1
6.25 TABLET ORAL 2 TIMES DAILY
Qty: 60 TABLET | Refills: 2 | Status: SHIPPED | OUTPATIENT
Start: 2021-11-05 | End: 2022-06-24 | Stop reason: SDUPTHER

## 2021-11-18 ENCOUNTER — PATIENT MESSAGE (OUTPATIENT)
Dept: FAMILY MEDICINE | Facility: CLINIC | Age: 56
End: 2021-11-18
Payer: MEDICARE

## 2021-12-15 ENCOUNTER — PATIENT MESSAGE (OUTPATIENT)
Dept: FAMILY MEDICINE | Facility: CLINIC | Age: 56
End: 2021-12-15
Payer: MEDICARE

## 2021-12-15 ENCOUNTER — PATIENT OUTREACH (OUTPATIENT)
Dept: FAMILY MEDICINE | Facility: CLINIC | Age: 56
End: 2021-12-15
Payer: MEDICARE

## 2021-12-21 ENCOUNTER — PATIENT MESSAGE (OUTPATIENT)
Dept: NEUROLOGY | Facility: CLINIC | Age: 56
End: 2021-12-21
Payer: MEDICARE

## 2022-01-01 PROBLEM — J30.2 SEASONAL ALLERGIES: Status: ACTIVE | Noted: 2022-01-01

## 2022-01-01 PROBLEM — G43.709 CHRONIC MIGRAINE W/O AURA W/O STATUS MIGRAINOSUS, NOT INTRACTABLE: Status: ACTIVE | Noted: 2022-01-01

## 2022-01-01 PROBLEM — I97.2 LYMPHEDEMA SYNDROME, POSTMASTECTOMY: Status: ACTIVE | Noted: 2022-01-01

## 2022-02-24 ENCOUNTER — TELEPHONE (OUTPATIENT)
Dept: FAMILY MEDICINE | Facility: CLINIC | Age: 57
End: 2022-02-24
Payer: MEDICARE

## 2022-02-24 ENCOUNTER — PATIENT MESSAGE (OUTPATIENT)
Dept: FAMILY MEDICINE | Facility: CLINIC | Age: 57
End: 2022-02-24
Payer: MEDICARE

## 2022-02-24 NOTE — TELEPHONE ENCOUNTER
Attempt to reach patient per telephone regarding elevated B/P at last visit with PCP, no answer.  Sent portal message. ZHAO

## 2022-02-28 ENCOUNTER — PATIENT MESSAGE (OUTPATIENT)
Dept: PSYCHIATRY | Facility: CLINIC | Age: 57
End: 2022-02-28
Payer: MEDICARE

## 2022-03-02 ENCOUNTER — TELEPHONE (OUTPATIENT)
Dept: ADMINISTRATIVE | Facility: HOSPITAL | Age: 57
End: 2022-03-02
Payer: MEDICARE

## 2022-06-14 ENCOUNTER — PATIENT MESSAGE (OUTPATIENT)
Dept: FAMILY MEDICINE | Facility: CLINIC | Age: 57
End: 2022-06-14
Payer: MEDICARE

## 2022-06-14 DIAGNOSIS — I10 ESSENTIAL HYPERTENSION: Chronic | ICD-10-CM

## 2022-06-14 DIAGNOSIS — F41.1 GAD (GENERALIZED ANXIETY DISORDER): ICD-10-CM

## 2022-06-14 DIAGNOSIS — I48.20 ATRIAL FIBRILLATION, CHRONIC: Chronic | ICD-10-CM

## 2022-06-14 DIAGNOSIS — G43.709 CHRONIC MIGRAINE W/O AURA W/O STATUS MIGRAINOSUS, NOT INTRACTABLE: ICD-10-CM

## 2022-06-15 ENCOUNTER — PATIENT MESSAGE (OUTPATIENT)
Dept: FAMILY MEDICINE | Facility: CLINIC | Age: 57
End: 2022-06-15
Payer: MEDICARE

## 2022-06-16 ENCOUNTER — TELEPHONE (OUTPATIENT)
Dept: FAMILY MEDICINE | Facility: CLINIC | Age: 57
End: 2022-06-16
Payer: MEDICARE

## 2022-06-16 NOTE — TELEPHONE ENCOUNTER
Patient needing referral for orthopedics for her back. Appointment schedule but referral needed to keep appointment

## 2022-06-16 NOTE — TELEPHONE ENCOUNTER
----- Message from Kevin Baum sent at 6/16/2022  7:57 AM CDT -----  Contact: pt at [phone]  Type: Needs Medical Advice  Who Called:  pt  Best Call Back Number: [phone]  Additional Information: pt is calling the office requesting a call back from the nurse. Please call back and advise.

## 2022-06-17 NOTE — TELEPHONE ENCOUNTER
Called and spoke with pt's partner. She is at [facility] now. Advised partner we would let Dr. Galvez know on Monday, when she is in the office.   *See 6/15/22 patient message.*    ----- Message from Anu Nunes sent at 6/17/2022 10:49 AM CDT -----  Patient Call Back    Who Called: PT     What is the request in detail: pt calling to state that she is going to [facility] now. Pls advise.    Can the clinic reply by [facility]?    Best Call Back Number: [phone]

## 2022-06-20 ENCOUNTER — TELEPHONE (OUTPATIENT)
Dept: PRIMARY CARE CLINIC | Facility: CLINIC | Age: 57
End: 2022-06-20

## 2022-06-20 NOTE — TELEPHONE ENCOUNTER
Pt came down her , stated she has an appt with Dr Davidson, Pt is not schedule with Dr Davidson, the appt is set up with Dr Lynn, pt needing a toradol shot and xanax refills and all other meds, Per Dr Davidson will not refill xanax and not give toradol re schedule pt with  Dr Lynn..

## 2022-06-23 ENCOUNTER — OFFICE VISIT (OUTPATIENT)
Dept: FAMILY MEDICINE | Facility: CLINIC | Age: 57
End: 2022-06-23
Payer: MEDICARE

## 2022-06-23 VITALS
SYSTOLIC BLOOD PRESSURE: 189 MMHG | HEIGHT: 65 IN | HEART RATE: 84 BPM | WEIGHT: 203.94 LBS | BODY MASS INDEX: 33.98 KG/M2 | OXYGEN SATURATION: 97 % | DIASTOLIC BLOOD PRESSURE: 108 MMHG

## 2022-06-23 DIAGNOSIS — M54.9 BACK PAIN, UNSPECIFIED BACK LOCATION, UNSPECIFIED BACK PAIN LATERALITY, UNSPECIFIED CHRONICITY: Primary | ICD-10-CM

## 2022-06-23 PROCEDURE — 3080F DIAST BP >= 90 MM HG: CPT | Mod: CPTII,S$GLB,, | Performed by: INTERNAL MEDICINE

## 2022-06-23 PROCEDURE — 99212 OFFICE O/P EST SF 10 MIN: CPT | Mod: S$GLB,,, | Performed by: INTERNAL MEDICINE

## 2022-06-23 PROCEDURE — 3008F PR BODY MASS INDEX (BMI) DOCUMENTED: ICD-10-PCS | Mod: CPTII,S$GLB,, | Performed by: INTERNAL MEDICINE

## 2022-06-23 PROCEDURE — 99212 PR OFFICE/OUTPT VISIT, EST, LEVL II, 10-19 MIN: ICD-10-PCS | Mod: S$GLB,,, | Performed by: INTERNAL MEDICINE

## 2022-06-23 PROCEDURE — 3077F SYST BP >= 140 MM HG: CPT | Mod: CPTII,S$GLB,, | Performed by: INTERNAL MEDICINE

## 2022-06-23 PROCEDURE — 99999 PR PBB SHADOW E&M-EST. PATIENT-LVL V: CPT | Mod: PBBFAC,,, | Performed by: INTERNAL MEDICINE

## 2022-06-23 PROCEDURE — 99999 PR PBB SHADOW E&M-EST. PATIENT-LVL V: ICD-10-PCS | Mod: PBBFAC,,, | Performed by: INTERNAL MEDICINE

## 2022-06-23 PROCEDURE — 3077F PR MOST RECENT SYSTOLIC BLOOD PRESSURE >= 140 MM HG: ICD-10-PCS | Mod: CPTII,S$GLB,, | Performed by: INTERNAL MEDICINE

## 2022-06-23 PROCEDURE — 1159F PR MEDICATION LIST DOCUMENTED IN MEDICAL RECORD: ICD-10-PCS | Mod: CPTII,S$GLB,, | Performed by: INTERNAL MEDICINE

## 2022-06-23 PROCEDURE — 3080F PR MOST RECENT DIASTOLIC BLOOD PRESSURE >= 90 MM HG: ICD-10-PCS | Mod: CPTII,S$GLB,, | Performed by: INTERNAL MEDICINE

## 2022-06-23 PROCEDURE — 1159F MED LIST DOCD IN RCRD: CPT | Mod: CPTII,S$GLB,, | Performed by: INTERNAL MEDICINE

## 2022-06-23 PROCEDURE — 3008F BODY MASS INDEX DOCD: CPT | Mod: CPTII,S$GLB,, | Performed by: INTERNAL MEDICINE

## 2022-06-23 RX ORDER — SULFAMETHOXAZOLE AND TRIMETHOPRIM 800; 160 MG/1; MG/1
1 TABLET ORAL 2 TIMES DAILY
Qty: 20 TABLET | Refills: 0 | Status: SHIPPED | OUTPATIENT
Start: 2022-06-23 | End: 2022-07-03

## 2022-06-23 NOTE — PROGRESS NOTES
Subjective:       Patient ID: Sasha Hensley is a 57 y.o. female.    Chief Complaint: Pt requests referral and Medication Refill (Xanax)    Severe back pain, needs FU with Ortho, needs HH service.    Review of Systems   Constitutional: Negative for activity change, appetite change, diaphoresis, fatigue, fever and unexpected weight change.   HENT: Negative for nasal congestion, dental problem, ear discharge, ear pain, hearing loss, mouth dryness, postnasal drip, rhinorrhea, sinus pressure/congestion, sore throat and tinnitus.    Eyes: Negative for pain, redness and visual disturbance.   Respiratory: Negative for cough, choking, chest tightness, shortness of breath and wheezing.    Cardiovascular: Negative for chest pain, palpitations and leg swelling.   Gastrointestinal: Negative for abdominal distention, abdominal pain, blood in stool, nausea and reflux.   Endocrine: Negative for cold intolerance, heat intolerance and polyuria.   Genitourinary: Negative for bladder incontinence, dysuria, frequency, genital sores and hot flashes.   Musculoskeletal: Negative for back pain, joint swelling and neck pain.   Integumentary:  Negative for rash, mole/lesion and breast mass.   Allergic/Immunologic: Negative for food allergies.   Neurological: Negative for dizziness, tremors, seizures, weakness, headaches and memory loss.   Hematological: Negative for adenopathy.   Psychiatric/Behavioral: Negative for behavioral problems and suicidal ideas.   Breast: Negative for mass        Objective:      Physical Exam  Vitals and nursing note reviewed.   Constitutional:       Appearance: Normal appearance. She is obese.   HENT:      Head: Normocephalic and atraumatic.      Right Ear: Tympanic membrane, ear canal and external ear normal.      Left Ear: Tympanic membrane, ear canal and external ear normal.      Mouth/Throat:      Mouth: Mucous membranes are moist.      Pharynx: Oropharynx is clear.   Eyes:      Extraocular Movements: Extraocular  movements intact.      Conjunctiva/sclera: Conjunctivae normal.      Pupils: Pupils are equal, round, and reactive to light.   Cardiovascular:      Rate and Rhythm: Normal rate and regular rhythm.      Pulses: Normal pulses.      Heart sounds: Normal heart sounds.   Pulmonary:      Effort: Pulmonary effort is normal.      Breath sounds: Normal breath sounds.   Abdominal:      General: Abdomen is flat. Bowel sounds are normal.      Palpations: Abdomen is soft.   Musculoskeletal:         General: Normal range of motion.      Cervical back: Normal range of motion and neck supple.   Skin:     General: Skin is warm.      Capillary Refill: Capillary refill takes less than 2 seconds.   Neurological:      General: No focal deficit present.      Mental Status: She is alert and oriented to person, place, and time. Mental status is at baseline.   Psychiatric:         Mood and Affect: Mood normal.         Judgment: Judgment normal.         Assessment/Plan:       Problem List Items Addressed This Visit    None     Visit Diagnoses     Back pain, unspecified back location, unspecified back pain laterality, unspecified chronicity    -  Primary    Relevant Orders    Ambulatory referral/consult to Home Health    Ambulatory referral/consult to Neurosurgery           MDM:

## 2022-06-24 ENCOUNTER — PATIENT MESSAGE (OUTPATIENT)
Dept: FAMILY MEDICINE | Facility: CLINIC | Age: 57
End: 2022-06-24
Payer: MEDICARE

## 2022-06-24 ENCOUNTER — PATIENT MESSAGE (OUTPATIENT)
Dept: PSYCHIATRY | Facility: CLINIC | Age: 57
End: 2022-06-24
Payer: MEDICARE

## 2022-06-24 DIAGNOSIS — G43.709 CHRONIC MIGRAINE W/O AURA W/O STATUS MIGRAINOSUS, NOT INTRACTABLE: ICD-10-CM

## 2022-06-24 DIAGNOSIS — F41.1 GAD (GENERALIZED ANXIETY DISORDER): ICD-10-CM

## 2022-06-24 DIAGNOSIS — I10 ESSENTIAL HYPERTENSION: Chronic | ICD-10-CM

## 2022-06-24 DIAGNOSIS — I48.20 ATRIAL FIBRILLATION, CHRONIC: Chronic | ICD-10-CM

## 2022-06-24 RX ORDER — ALPRAZOLAM 2 MG/1
TABLET ORAL
Qty: 90 TABLET | Refills: 0 | Status: SHIPPED | OUTPATIENT
Start: 2022-06-24 | End: 2023-08-30

## 2022-06-24 RX ORDER — RIZATRIPTAN BENZOATE 10 MG/1
10 TABLET, ORALLY DISINTEGRATING ORAL
Qty: 18 TABLET | Refills: 1 | Status: SHIPPED | OUTPATIENT
Start: 2022-06-24 | End: 2023-08-30

## 2022-06-24 RX ORDER — CARVEDILOL 6.25 MG/1
6.25 TABLET ORAL 2 TIMES DAILY
Qty: 60 TABLET | Refills: 2 | Status: SHIPPED | OUTPATIENT
Start: 2022-06-24 | End: 2023-08-30

## 2022-06-24 NOTE — TELEPHONE ENCOUNTER
Dear Dr. Mejia,     In the absence of Dr. Josefa Galvez, can you please address this patients concern or request?    Thank you,  ONOFRE Marie 06/23/22

## 2022-06-28 ENCOUNTER — TELEPHONE (OUTPATIENT)
Dept: FAMILY MEDICINE | Facility: CLINIC | Age: 57
End: 2022-06-28
Payer: MEDICARE

## 2022-06-28 NOTE — TELEPHONE ENCOUNTER
"Capri from [facility] called in needing a medication list for patient so that patient could be seen. I informed her that I would fax it over right now to [phone]. She verbalized understanding     Your fax has been successfully sent to [phone] at [phone].  ------------------------------------------------------------  From: [id number]  ------------------------------------------------------------  6/28/2022 9:36:30 AM Transmission Record          Sent to [phone] with remote ID "[id number]      "          Result: (0/339;0/0) Success          Page record: 1 - 5          Elapsed time: 01:45 on channel 29    "

## 2022-07-08 RX ORDER — ALPRAZOLAM 2 MG/1
TABLET ORAL
Qty: 90 TABLET | Refills: 2 | OUTPATIENT
Start: 2022-07-08 | End: 2023-06-14

## 2022-07-08 RX ORDER — CARVEDILOL 6.25 MG/1
6.25 TABLET ORAL 2 TIMES DAILY
Qty: 60 TABLET | Refills: 2 | OUTPATIENT
Start: 2022-07-08 | End: 2022-10-06

## 2022-07-08 RX ORDER — RIZATRIPTAN BENZOATE 10 MG/1
10 TABLET, ORALLY DISINTEGRATING ORAL
Qty: 18 TABLET | Refills: 1 | OUTPATIENT
Start: 2022-07-08 | End: 2022-10-06

## 2022-07-14 ENCOUNTER — PATIENT MESSAGE (OUTPATIENT)
Dept: FAMILY MEDICINE | Facility: CLINIC | Age: 57
End: 2022-07-14
Payer: MEDICARE

## 2022-11-13 ENCOUNTER — PATIENT MESSAGE (OUTPATIENT)
Dept: FAMILY MEDICINE | Facility: CLINIC | Age: 57
End: 2022-11-13
Payer: MEDICARE

## 2022-12-01 ENCOUNTER — PATIENT MESSAGE (OUTPATIENT)
Dept: PSYCHIATRY | Facility: CLINIC | Age: 57
End: 2022-12-01
Payer: MEDICARE

## 2023-02-20 ENCOUNTER — PATIENT MESSAGE (OUTPATIENT)
Dept: PSYCHIATRY | Facility: CLINIC | Age: 58
End: 2023-02-20
Payer: MEDICARE

## 2023-07-10 ENCOUNTER — PATIENT OUTREACH (OUTPATIENT)
Dept: ADMINISTRATIVE | Facility: HOSPITAL | Age: 58
End: 2023-07-10
Payer: MEDICARE

## 2023-07-10 NOTE — PROGRESS NOTES
Population Health Outreach.    Attempted to reach Ms. Hensley per telephone regarding establishing care with new PCP. No answer, lvm

## 2023-07-21 ENCOUNTER — PATIENT MESSAGE (OUTPATIENT)
Dept: PSYCHIATRY | Facility: CLINIC | Age: 58
End: 2023-07-21
Payer: MEDICARE

## 2023-08-23 ENCOUNTER — OFFICE VISIT (OUTPATIENT)
Dept: FAMILY MEDICINE | Facility: CLINIC | Age: 58
End: 2023-08-23
Payer: COMMERCIAL

## 2023-08-23 VITALS
SYSTOLIC BLOOD PRESSURE: 146 MMHG | WEIGHT: 200.75 LBS | BODY MASS INDEX: 34.27 KG/M2 | DIASTOLIC BLOOD PRESSURE: 78 MMHG | TEMPERATURE: 73 F | HEIGHT: 64 IN | OXYGEN SATURATION: 97 %

## 2023-08-23 DIAGNOSIS — C50.919 CARCINOMA OF BREAST METASTATIC TO MULTIPLE SITES, UNSPECIFIED LATERALITY: Primary | ICD-10-CM

## 2023-08-23 DIAGNOSIS — Z78.9 NONSMOKER: ICD-10-CM

## 2023-08-23 DIAGNOSIS — I10 ESSENTIAL HYPERTENSION: ICD-10-CM

## 2023-08-23 DIAGNOSIS — L60.9 NAIL DISORDER: ICD-10-CM

## 2023-08-23 DIAGNOSIS — L98.9 NON-HEALING SKIN LESION OF NOSE: ICD-10-CM

## 2023-08-23 DIAGNOSIS — R22.31 AXILLARY MASS, RIGHT: ICD-10-CM

## 2023-08-23 PROCEDURE — 99214 PR OFFICE/OUTPT VISIT, EST, LEVL IV, 30-39 MIN: ICD-10-PCS | Mod: S$GLB,,, | Performed by: FAMILY MEDICINE

## 2023-08-23 PROCEDURE — 99999 PR PBB SHADOW E&M-EST. PATIENT-LVL IV: CPT | Mod: PBBFAC,,, | Performed by: FAMILY MEDICINE

## 2023-08-23 PROCEDURE — 3077F PR MOST RECENT SYSTOLIC BLOOD PRESSURE >= 140 MM HG: ICD-10-PCS | Mod: S$GLB,,, | Performed by: FAMILY MEDICINE

## 2023-08-23 PROCEDURE — 99214 OFFICE O/P EST MOD 30 MIN: CPT | Mod: S$GLB,,, | Performed by: FAMILY MEDICINE

## 2023-08-23 PROCEDURE — 99999 PR PBB SHADOW E&M-EST. PATIENT-LVL IV: ICD-10-PCS | Mod: PBBFAC,,, | Performed by: FAMILY MEDICINE

## 2023-08-23 PROCEDURE — 3008F BODY MASS INDEX DOCD: CPT | Mod: S$GLB,,, | Performed by: FAMILY MEDICINE

## 2023-08-23 PROCEDURE — 3078F DIAST BP <80 MM HG: CPT | Mod: S$GLB,,, | Performed by: FAMILY MEDICINE

## 2023-08-23 PROCEDURE — 3008F PR BODY MASS INDEX (BMI) DOCUMENTED: ICD-10-PCS | Mod: S$GLB,,, | Performed by: FAMILY MEDICINE

## 2023-08-23 PROCEDURE — 3078F PR MOST RECENT DIASTOLIC BLOOD PRESSURE < 80 MM HG: ICD-10-PCS | Mod: S$GLB,,, | Performed by: FAMILY MEDICINE

## 2023-08-23 PROCEDURE — 3077F SYST BP >= 140 MM HG: CPT | Mod: S$GLB,,, | Performed by: FAMILY MEDICINE
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
## 2023-08-28 ENCOUNTER — PATIENT MESSAGE (OUTPATIENT)
Dept: PRIMARY CARE CLINIC | Facility: CLINIC | Age: 58
End: 2023-08-28
Payer: COMMERCIAL

## 2023-08-30 ENCOUNTER — PATIENT MESSAGE (OUTPATIENT)
Dept: HEMATOLOGY/ONCOLOGY | Facility: CLINIC | Age: 58
End: 2023-08-30
Payer: COMMERCIAL

## 2023-08-30 ENCOUNTER — HOSPITAL ENCOUNTER (OUTPATIENT)
Dept: RADIOLOGY | Facility: HOSPITAL | Age: 58
Discharge: HOME | End: 2023-08-30
Attending: PODIATRIST
Payer: COMMERCIAL

## 2023-08-30 ENCOUNTER — OFFICE VISIT (OUTPATIENT)
Dept: PODIATRY | Facility: CLINIC | Age: 58
End: 2023-08-30
Payer: COMMERCIAL

## 2023-08-30 VITALS
BODY MASS INDEX: 35.61 KG/M2 | DIASTOLIC BLOOD PRESSURE: 85 MMHG | WEIGHT: 201 LBS | HEART RATE: 61 BPM | HEIGHT: 63 IN | SYSTOLIC BLOOD PRESSURE: 156 MMHG

## 2023-08-30 DIAGNOSIS — M79.671 FOOT PAIN, RIGHT: ICD-10-CM

## 2023-08-30 DIAGNOSIS — M72.2 PLANTAR FASCIITIS: ICD-10-CM

## 2023-08-30 DIAGNOSIS — M79.2 NEURITIS: ICD-10-CM

## 2023-08-30 DIAGNOSIS — M79.671 FOOT PAIN, RIGHT: Primary | ICD-10-CM

## 2023-08-30 DIAGNOSIS — G35 MULTIPLE SCLEROSIS: ICD-10-CM

## 2023-08-30 DIAGNOSIS — C50.919 CARCINOMA OF BREAST METASTATIC TO MULTIPLE SITES, UNSPECIFIED LATERALITY: ICD-10-CM

## 2023-08-30 DIAGNOSIS — B35.1 ONYCHOMYCOSIS DUE TO DERMATOPHYTE: ICD-10-CM

## 2023-08-30 DIAGNOSIS — M76.70 PERONEAL TENDONITIS, UNSPECIFIED LATERALITY: ICD-10-CM

## 2023-08-30 PROCEDURE — 1160F PR REVIEW ALL MEDS BY PRESCRIBER/CLIN PHARMACIST DOCUMENTED: ICD-10-PCS | Mod: S$GLB,,, | Performed by: PODIATRIST

## 2023-08-30 PROCEDURE — 73630 X-RAY EXAM OF FOOT: CPT | Mod: TC,RT

## 2023-08-30 PROCEDURE — 73630 X-RAY EXAM OF FOOT: CPT | Mod: 26,RT,, | Performed by: RADIOLOGY

## 2023-08-30 PROCEDURE — 99203 OFFICE O/P NEW LOW 30 MIN: CPT | Mod: S$GLB,,, | Performed by: PODIATRIST

## 2023-08-30 PROCEDURE — 3079F DIAST BP 80-89 MM HG: CPT | Mod: S$GLB,,, | Performed by: PODIATRIST

## 2023-08-30 PROCEDURE — 99203 PR OFFICE/OUTPT VISIT, NEW, LEVL III, 30-44 MIN: ICD-10-PCS | Mod: S$GLB,,, | Performed by: PODIATRIST

## 2023-08-30 PROCEDURE — 99999 PR PBB SHADOW E&M-EST. PATIENT-LVL V: ICD-10-PCS | Mod: PBBFAC,,, | Performed by: PODIATRIST

## 2023-08-30 PROCEDURE — 1159F PR MEDICATION LIST DOCUMENTED IN MEDICAL RECORD: ICD-10-PCS | Mod: S$GLB,,, | Performed by: PODIATRIST

## 2023-08-30 PROCEDURE — 99999 PR PBB SHADOW E&M-EST. PATIENT-LVL V: CPT | Mod: PBBFAC,,, | Performed by: PODIATRIST

## 2023-08-30 PROCEDURE — 3077F PR MOST RECENT SYSTOLIC BLOOD PRESSURE >= 140 MM HG: ICD-10-PCS | Mod: S$GLB,,, | Performed by: PODIATRIST

## 2023-08-30 PROCEDURE — 3077F SYST BP >= 140 MM HG: CPT | Mod: S$GLB,,, | Performed by: PODIATRIST

## 2023-08-30 PROCEDURE — 73630 XR FOOT COMPLETE 3 VIEW RIGHT: ICD-10-PCS | Mod: 26,RT,, | Performed by: RADIOLOGY

## 2023-08-30 PROCEDURE — 3079F PR MOST RECENT DIASTOLIC BLOOD PRESSURE 80-89 MM HG: ICD-10-PCS | Mod: S$GLB,,, | Performed by: PODIATRIST

## 2023-08-30 PROCEDURE — 3008F BODY MASS INDEX DOCD: CPT | Mod: S$GLB,,, | Performed by: PODIATRIST

## 2023-08-30 PROCEDURE — 3008F PR BODY MASS INDEX (BMI) DOCUMENTED: ICD-10-PCS | Mod: S$GLB,,, | Performed by: PODIATRIST

## 2023-08-30 PROCEDURE — 1160F RVW MEDS BY RX/DR IN RCRD: CPT | Mod: S$GLB,,, | Performed by: PODIATRIST

## 2023-08-30 PROCEDURE — 1159F MED LIST DOCD IN RCRD: CPT | Mod: S$GLB,,, | Performed by: PODIATRIST

## 2023-08-30 RX ORDER — SODIUM CHLORIDE FOR INHALATION 0.9 %
VIAL, NEBULIZER (ML) INHALATION
COMMUNITY
Start: 2023-08-18

## 2023-08-30 RX ORDER — IVERMECTIN 3 MG/1
60 TABLET ORAL ONCE
COMMUNITY

## 2023-08-30 RX ORDER — DICLOFENAC SODIUM 10 MG/G
2 GEL TOPICAL 4 TIMES DAILY
Qty: 200 G | Refills: 2 | Status: SHIPPED | OUTPATIENT
Start: 2023-08-30 | End: 2023-09-29

## 2023-09-03 PROBLEM — M79.671 FOOT PAIN, RIGHT: Status: ACTIVE | Noted: 2023-09-03

## 2023-09-03 PROBLEM — M72.2 PLANTAR FASCIITIS: Status: ACTIVE | Noted: 2023-09-03

## 2023-09-03 PROBLEM — B35.1 ONYCHOMYCOSIS DUE TO DERMATOPHYTE: Status: ACTIVE | Noted: 2023-09-03

## 2023-09-03 PROBLEM — M79.2 NEURITIS: Status: ACTIVE | Noted: 2023-09-03

## 2023-09-03 PROBLEM — M76.70 PERONEAL TENDONITIS, UNSPECIFIED LATERALITY: Status: ACTIVE | Noted: 2023-09-03

## 2023-09-03 NOTE — PROGRESS NOTES
Subjective:       Patient ID: Sasha Hensley is a 58 y.o. female.    Chief Complaint: Foot Pain  Patient presents today with a  she is complaining of right foot pain that has been going on for about 8-9 months she does have a history of breast cancer bilateral and states that she has been cancer-free for the past 5 years but she did have a recent PET scan and is going to be following up with oncology.  Patient is concerned about the possibility of cancer in and around the toenail of the right great toe she is also having pain on the bottom and outside of the right foot times several months she has over-the-counter inserts.  Patient has a history of MS times 20 years.    Past Medical History:   Diagnosis Date    Anxiety     Aphasia due to closed TBI (traumatic brain injury)     Arthritis     Asthma     Bladder prolapse, female, acquired     Breast cancer     Cerebrovascular small vessel disease     HTN (hypertension)     IBS (irritable bowel syndrome)     Migraines     Mouth ulcers     Multiple sclerosis     Neoplasm of adipose tissue     Osteoporosis     Sciatica     Scoliosis      Past Surgical History:   Procedure Laterality Date    BILATERAL MASTECTOMY      TOTAL ABDOMINAL HYSTERECTOMY W/ BILATERAL SALPINGOOPHORECTOMY      partial      Family History   Problem Relation Age of Onset    Multiple sclerosis Mother     Parkinsonism Mother     Multiple sclerosis Maternal Grandmother     Multiple sclerosis Maternal Aunt     Parkinsonism Father     Heart disease Father     Heart disease Son      Social History     Socioeconomic History    Marital status: Single   Tobacco Use    Smoking status: Never    Smokeless tobacco: Never   Substance and Sexual Activity    Alcohol use: Not Currently    Drug use: Never    Sexual activity: Not Currently       Current Outpatient Medications   Medication Sig Dispense Refill    ALPRAZolam (XANAX) 2 MG Tab 1/2 tab PO qid prn anxiety 90 tablet 0    carvediloL (COREG) 6.25 MG tablet  Take 1 tablet (6.25 mg total) by mouth 2 (two) times daily. 60 tablet 2    ivermectin (STROMECTOL) 3 mg Tab Take 60 mg by mouth once.      rizatriptan (MAXALT-MLT) 10 MG disintegrating tablet Take 1 tablet (10 mg total) by mouth as needed for Migraine. May repeat in 2 hours if needed 18 tablet 1    alfalfa 250 mg Tab Take 433 mg by mouth once daily.      arginine 500 mg tablet Take 500 mg by mouth once daily.      azelastine (ASTELIN) 137 mcg (0.1 %) nasal spray 2 sprays (274 mcg total) by Nasal route 2 (two) times daily. (Patient not taking: Reported on 6/23/2022) 60 mL 3    cholecalciferol, vitamin D3, 10 mcg (400 unit) Cap capsule Take 400 Units by mouth once daily.      cyanocobalamin 500 MCG tablet Take 500 mcg by mouth once daily.      diclofenac sodium (VOLTAREN) 1 % Gel Apply 2 g topically 4 (four) times daily. 200 g 2    flaxseed oil 1,000 mg Cap Take 1 capsule by mouth once daily.      garlic 1,000 mg Cap Take 1 tablet by mouth once daily.      ginkgo biloba 60 mg Cap Take 1 tablet by mouth once daily.      GUM UZRWOH-PNBFIW-ONUJ-ALCOHOL MISC Take 500 mg by mouth once daily.      HONEY ORAL Take 1 spray by mouth once daily.      L.acid,gas,plan,rhm/B.ani/cran (UP4 PROBIOTICS WOMEN'S ORAL) Take 1 tablet by mouth once daily.      LICORICE ROOT EXTRACT, BULK, MISC Take 1 tablet by mouth once daily.      magnesium 200 mg Tab Take 1 tablet by mouth once daily.      MAGNESIUM ORAL Take 500 mg by mouth once a week.      metoclopramide HCl (REGLAN) 5 MG tablet Take 1 tablet (5 mg total) by mouth 3 (three) times daily before meals. (Patient not taking: Reported on 6/23/2022) 30 tablet 0    multivit-min/folic acid/biotin (HAIR,SKIN AND NAILS,FA-BIOTIN, ORAL) Take 3,000 mcg by mouth once daily.      mv-min-folic acid-lutein (CENTRUM SILVER) 400-250 mcg Chew Take 1 tablet by mouth once daily.      mv-mn-vitC-islNs-Vst-Sys-hc124 (AIRBORNE, ASCORBATE SODIUM,) 333-1.7 mg Chew Take 1 tablet by mouth once daily.       niacin 500 MG CpSR Take 250 mg by mouth every evening.      omega-3 fatty acids/fish oil (FISH OIL-OMEGA-3 FATTY ACIDS) 300-1,000 mg capsule Take 1 capsule by mouth once daily.      omeprazole (PRILOSEC) 40 MG capsule Take 1 capsule (40 mg total) by mouth 2 (two) times daily before meals. 180 capsule 3    ondansetron (ZOFRAN-ODT) 4 MG TbDL Take 4 mg by mouth once daily.      Panax, American ginsg/B12/royl (GINSENG COMPLEX ORAL) Take 1 tablet by mouth once daily.      potassium 99 mg Tab Take 1 tablet by mouth once daily.      pyridoxine, vitamin B6, (B-6) 100 MG Tab Take 50 mg by mouth once daily.      royal jelly-bee pollen-ginseng -250 mg Cap Take 1 tablet by mouth once daily.      sodium chloride for inhalation (SODIUM CHLORIDE 0.9%) 0.9 % nebulizer solution SMARTSI Vial(s) Via Nebulizer PRN      soy isofla/blk cohosh/mag bark (ESTROVEN ORAL) Take 1 tablet by mouth once daily.      soy-blk cohosh-green tea-yerba (ESTROVEN ENERGY) 56- mg Tab Take 1 tablet by mouth once daily.      valACYclovir (VALTREX) 1000 MG tablet Take 1 tablet (1,000 mg total) by mouth 3 (three) times daily. for 10 days 30 tablet 3    VALERIAN ROOT ORAL Take 880 mg by mouth once daily.      vitamin E 400 UNIT capsule Take 400 Units by mouth once daily.      zinc gluconate 50 mg tablet Take 50 mg by mouth once daily.       No current facility-administered medications for this visit.     Review of patient's allergies indicates:   Allergen Reactions    Nsaids (non-steroidal anti-inflammatory drug) Swelling    Peanut Anaphylaxis    Sumatriptan Shortness Of Breath    Blue-emu lidocaine patch [lidocaine] Nausea And Vomiting    Ipratropium analogues Other (See Comments) and Photosensitivity    Perfume Other (See Comments)     Migraines     Latex Rash       Review of Systems   Musculoskeletal:  Positive for arthralgias, gait problem and joint swelling.   Neurological:  Positive for numbness.   All other systems reviewed and are  "negative.      Objective:      Vitals:    08/30/23 1347   BP: (!) 156/85   BP Location: Left forearm   Patient Position: Sitting   Pulse: 61   Weight: 91.2 kg (201 lb)   Height: 5' 3" (1.6 m)     Physical Exam  Vitals and nursing note reviewed. Exam conducted with a chaperone present.   Constitutional:       Appearance: Normal appearance.   Cardiovascular:      Pulses:           Dorsalis pedis pulses are 1+ on the right side and 1+ on the left side.        Posterior tibial pulses are 1+ on the right side and 1+ on the left side.   Pulmonary:      Effort: Pulmonary effort is normal.   Musculoskeletal:         General: Swelling and tenderness present.      Right foot: Deformity present.        Feet:    Feet:      Right foot:      Protective Sensation: 3 sites tested.  2 sites sensed.      Skin integrity: Erythema and warmth present.      Toenail Condition: Fungal disease present.     Left foot:      Protective Sensation: 3 sites tested.  2 sites sensed.   Skin:     Capillary Refill: Capillary refill takes 2 to 3 seconds.      Findings: Erythema present.   Neurological:      Mental Status: She is alert.      Sensory: Sensory deficit present.   Psychiatric:         Mood and Affect: Mood normal.         Behavior: Behavior normal.                          Assessment:       1. Foot pain, right    2. Plantar fasciitis    3. Peroneal tendonitis, unspecified laterality    4. Onychomycosis due to dermatophyte    5. Carcinoma of breast metastatic to multiple sites, unspecified laterality    6. Neuritis    7. Multiple sclerosis        Plan:       Patient presents today with a  she is complaining of right foot pain that has been going on for about 8-9 months she does have a history of breast cancer bilateral and states that she has been cancer-free for the past 5 years but she did have a recent PET scan and is going to be following up with oncology.  Patient is concerned about the possibility of cancer in and around the " toenail of the right great toe she is also having pain on the bottom and outside of the right foot times several months she has over-the-counter inserts.  Patient has a history of MS times 20 years.  A comprehensive new patient evaluation was performed today patient does have discomfort along the plantar lateral aspect of the right foot she has some associated sural neuritis in this area.  Patient and her  today were very concerned about the possibility of carcinoma in the lateral aspect of the patient's right hallux nail where she has discoloration in the nail I assured them this is fungal involvement of the nail I was able to aggressively trim and remove the area of discoloration and this does not extend to the level of the nail bed but is within the nail itself there is no definitive black align and these findings are consistent with fungal infection of the nail there was a lot of subungual debris present underlying the border which was causing the patient some discomfort not only did I trim and address the fungal portion of the nail but remove the subungual debris along the lateral nail border freeing the area of any potential pressure I did recommend the application of Vicks vapor rub to the area this applied twice a day every day over a period of 4-6 months has been shown to be effective in addressing nail fungus.  Regarding the inflammation and discomfort the patient is having on the plantar lateral aspect of the right foot x-rays were taken today patient does have an os perineum which I explained to the patient can be contributing to her discomfort it is along the course of the peroneal tendons and she may have some degree of peroneal tendinitis in addition to plantar fascial discomfort along the course of the plantar fascial ligament on the plantar lateral right.  I have recommended the application of Voltaren gel 3-4 times daily to help with the inflammation patient does not tolerate oral  anti-inflammatories.  Patient has some over-the-counter arch supports I have recommended adding a slight heel lift to these I have dispensed the patient some additional blue arch supports which I gave to the patient giving her more support in the arch area should help to take pressure off the lateral aspect of the right foot as well as the peroneal tendons the plantar fascia we will see how she does with this I will consider possibly a heel lift however I think that the additional arch support is more important at this time added to the over-the-counter inserts I showed the patient where to apply these and gave her additional arch support.  Patient is going to use the Vicks vapor rub the Voltaren gel will see how she is doing I do feel that the Voltaren gel will also address the sural neuritis patient advised if she is not seeing signs of improvement over the next several weeks to contact me for follow-up further evaluation and treatment as necessary.  X-rays reviewed in detail today.This note was created using [employer] voice recognition software that occasionally misinterpreted phrases or words.

## 2023-09-08 ENCOUNTER — OFFICE VISIT (OUTPATIENT)
Dept: HEMATOLOGY/ONCOLOGY | Facility: CLINIC | Age: 58
End: 2023-09-08
Payer: COMMERCIAL

## 2023-09-08 ENCOUNTER — TELEPHONE (OUTPATIENT)
Dept: HEMATOLOGY/ONCOLOGY | Facility: CLINIC | Age: 58
End: 2023-09-08
Payer: COMMERCIAL

## 2023-09-08 VITALS
OXYGEN SATURATION: 99 % | BODY MASS INDEX: 35.98 KG/M2 | SYSTOLIC BLOOD PRESSURE: 146 MMHG | HEART RATE: 69 BPM | RESPIRATION RATE: 18 BRPM | DIASTOLIC BLOOD PRESSURE: 73 MMHG | HEIGHT: 63 IN | WEIGHT: 203.06 LBS | TEMPERATURE: 99 F

## 2023-09-08 DIAGNOSIS — C50.411 MALIGNANT NEOPLASM OF UPPER-OUTER QUADRANT OF RIGHT BREAST IN FEMALE, ESTROGEN RECEPTOR POSITIVE: Primary | ICD-10-CM

## 2023-09-08 DIAGNOSIS — Z17.0 MALIGNANT NEOPLASM OF UPPER-OUTER QUADRANT OF RIGHT BREAST IN FEMALE, ESTROGEN RECEPTOR POSITIVE: Primary | ICD-10-CM

## 2023-09-08 DIAGNOSIS — R10.84 GENERALIZED ABDOMINAL PAIN: ICD-10-CM

## 2023-09-08 DIAGNOSIS — M72.2 PLANTAR FASCIITIS: ICD-10-CM

## 2023-09-08 DIAGNOSIS — C78.01 SECONDARY MALIGNANT NEOPLASM OF HILUS OF RIGHT LUNG: ICD-10-CM

## 2023-09-08 DIAGNOSIS — G43.119 INTRACTABLE MIGRAINE WITH AURA WITHOUT STATUS MIGRAINOSUS: ICD-10-CM

## 2023-09-08 PROCEDURE — 99999 PR PBB SHADOW E&M-EST. PATIENT-LVL V: ICD-10-PCS | Mod: PBBFAC,,, | Performed by: INTERNAL MEDICINE

## 2023-09-08 PROCEDURE — 3077F SYST BP >= 140 MM HG: CPT | Mod: CPTII,S$GLB,, | Performed by: INTERNAL MEDICINE

## 2023-09-08 PROCEDURE — 99999 PR PBB SHADOW E&M-EST. PATIENT-LVL V: CPT | Mod: PBBFAC,,, | Performed by: INTERNAL MEDICINE

## 2023-09-08 PROCEDURE — 99205 OFFICE O/P NEW HI 60 MIN: CPT | Mod: S$GLB,,, | Performed by: INTERNAL MEDICINE

## 2023-09-08 PROCEDURE — 3077F PR MOST RECENT SYSTOLIC BLOOD PRESSURE >= 140 MM HG: ICD-10-PCS | Mod: CPTII,S$GLB,, | Performed by: INTERNAL MEDICINE

## 2023-09-08 PROCEDURE — 3078F PR MOST RECENT DIASTOLIC BLOOD PRESSURE < 80 MM HG: ICD-10-PCS | Mod: CPTII,S$GLB,, | Performed by: INTERNAL MEDICINE

## 2023-09-08 PROCEDURE — 3008F PR BODY MASS INDEX (BMI) DOCUMENTED: ICD-10-PCS | Mod: CPTII,S$GLB,, | Performed by: INTERNAL MEDICINE

## 2023-09-08 PROCEDURE — 99205 PR OFFICE/OUTPT VISIT, NEW, LEVL V, 60-74 MIN: ICD-10-PCS | Mod: S$GLB,,, | Performed by: INTERNAL MEDICINE

## 2023-09-08 PROCEDURE — 1159F PR MEDICATION LIST DOCUMENTED IN MEDICAL RECORD: ICD-10-PCS | Mod: CPTII,S$GLB,, | Performed by: INTERNAL MEDICINE

## 2023-09-08 PROCEDURE — 1159F MED LIST DOCD IN RCRD: CPT | Mod: CPTII,S$GLB,, | Performed by: INTERNAL MEDICINE

## 2023-09-08 PROCEDURE — 3008F BODY MASS INDEX DOCD: CPT | Mod: CPTII,S$GLB,, | Performed by: INTERNAL MEDICINE

## 2023-09-08 PROCEDURE — 3078F DIAST BP <80 MM HG: CPT | Mod: CPTII,S$GLB,, | Performed by: INTERNAL MEDICINE

## 2023-09-08 RX ORDER — LETROZOLE 2.5 MG/1
2.5 TABLET, FILM COATED ORAL DAILY
Qty: 30 TABLET | Refills: 2 | Status: SHIPPED | OUTPATIENT
Start: 2023-09-08 | End: 2023-11-17

## 2023-09-08 NOTE — PROGRESS NOTES
[facility]/ [facility]   at [facility] Note:      Chief Complaint:   Encounter Diagnoses   Name Primary?    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    HPI:  Sasha Hensley is a 58 y.o. female who presents today for evaluation of newly diagnosed metastatic breast cancer. She has not seen oncology since the biopsy results. She is seeing Dr Mtz at [facility] for concurrent oncology     Oncology History  Patient initially diagnosed with left breast cancer in [place], [place] in 2018  Initially had a palpable lump  Diagnostic MMG 3/21/18: bilobed mass with calcs lateral breast 60mm and pleomorphic calcs [id number]  U/S: two spiculated masses 37mm in total (one [id number], other [id number]), -axilla    Bx 3/26/18: IDC, grade 2, ER 96%/PR90%/HER2 equivocal; Ki67 8%    CT C/A/P and bone scan 4/10/18 negative    Bilateral mastectomy with Dr. Wallis 4/19/18:  -Left: fibroadenoma  -Right:IDC, grade 3, 36mm; 0/3 LN+   -FISH: HER2 4.88, CEP17 3.15, equivocal (category 4)  [id number]    Oncotype high intermediate (26), declined chemotherapy (tested prior to results of TAILORx study).   Recommended for ovarian suppression and AI therapy. Patient declined. Documents from primary oncologist in [place] are available in Care Everywhere     She was considering [facility]GRIS flap reconstruction in July 2023 which prompted CT imaging for vascular review. CT demonstrated pulmonary nodules    PET 8/8/23: 2cm perihilar nodule within the DEEP with hypermetabolic uptake with an SUV of 10.9. A 1.5cm nodule within the left lower lobe is also hypermetabolic with an SUV of 8.1.  Non-mass like  hypermetabolic uptake within the thyroid gland and the proximal stomach which are most likely benign.     FNA/Lymph node aspirate 8/16/23: positive for malignant cells, consistent with metastatic poorly differentiated breast carcinoma  LN, station 11L, biopsy: 1/1 lymph node positive for metastatic carcinoma     Social History     Tobacco Use    Smoking status: Never    Smokeless tobacco: Never   Substance Use Topics    Alcohol use: Not Currently    Drug use: Never     Family History   Problem Relation Age of Onset    Multiple sclerosis Mother     Parkinsonism Mother     Multiple sclerosis Maternal Grandmother     Multiple sclerosis Maternal Aunt     Parkinsonism Father     Heart disease Father     Heart disease Son      Past Medical History:   Diagnosis Date    Anxiety     Aphasia due to closed TBI (traumatic brain injury)     Arthritis     Asthma     Bladder prolapse, female, acquired     Breast cancer     Cerebrovascular small vessel disease     HTN (hypertension)     IBS (irritable bowel syndrome)     Migraines     Mouth ulcers     Multiple sclerosis     Neoplasm of adipose tissue     Osteoporosis     Sciatica     Scoliosis      Past Surgical History:   Procedure Laterality Date    BILATERAL MASTECTOMY      TOTAL ABDOMINAL HYSTERECTOMY W/ BILATERAL SALPINGOOPHORECTOMY      partial        Patient Active Problem List   Diagnosis    Multiple sclerosis    IBS (irritable bowel syndrome)    Atrial fibrillation, chronic    Dyspnea on exertion    Abdominal swelling, generalized    MICHELLE (generalized anxiety disorder)    Generalized abdominal pain    Iron deficiency anemia    Scoliosis    Sciatica    Osteoporosis    Neoplasm of adipose tissue    Mouth ulcers    Chronic migraine    Cerebrovascular small vessel disease    Breast cancer    Bladder prolapse, female, acquired    Asthma    Arthritis    Aphasia due to closed TBI (traumatic brain injury)    Anxiety    Dysfunctional gallbladder    Bowel dysfunction    Severe  recurrent major depression without psychotic features    HTN (hypertension)    Essential hypertension    Chronic migraine w/o aura w/o status migrainosus, not intractable    Seasonal allergies    Lymphedema syndrome, postmastectomy    Foot pain, right    Plantar fasciitis    Peroneal tendonitis, unspecified laterality    Onychomycosis due to dermatophyte    Neuritis       Current Outpatient Medications   Medication Instructions    alfalfa 433 mg, Oral, Daily    ALPRAZolam (XANAX) 2 MG Tab 1/2 tab PO qid prn anxiety    arginine 500 mg, Oral, Daily    azelastine (ASTELIN) 274 mcg, Nasal, 2 times daily    carvediloL (COREG) 6.25 mg, Oral, 2 times daily    cholecalciferol (vitamin D3) 400 Units, Oral, Daily    cyanocobalamin 500 mcg, Oral, Daily    diclofenac sodium (VOLTAREN) 2 g, Topical (Top), 4 times daily    flaxseed oil 1,000 mg Cap 1 capsule, Oral, Daily    garlic 1,000 mg Cap 1 tablet, Oral, Daily    ginkgo biloba 60 mg Cap 1 tablet, Oral, Daily    GUM PXCSDQ-ICJSHW-TQOE-ALCOHOL MISC 500 mg, Oral, Daily    HONEY ORAL 1 spray, Oral, Daily    ivermectin (STROMECTOL) 60 mg, Oral, Once    L.acid,gas,plan,rhm/B.ani/cran (UP4 PROBIOTICS WOMEN'S ORAL) 1 tablet, Oral, Daily    letrozole (FEMARA) 2.5 mg, Oral, Daily    LICORICE ROOT EXTRACT, BULK, MISC 1 tablet, Oral, Daily    magnesium 200 mg Tab 1 tablet, Oral, Daily    MAGNESIUM ORAL 500 mg, Oral, Weekly    metoclopramide HCl (REGLAN) 5 mg, Oral, 3 times daily before meals    multivit-min/folic acid/biotin (HAIR,SKIN AND NAILS,FA-BIOTIN, ORAL) 3,000 mcg, Oral, Daily    mv-min-folic acid-lutein (CENTRUM SILVER) 400-250 mcg Chew 1 tablet, Oral, Daily    mv-mn-vitC-zjlQk-Off-Emw-hc124 (AIRBORNE, ASCORBATE SODIUM,) 333-1.7 mg Chew 1 tablet, Oral, Daily    niacin 250 mg, Oral, Nightly    omega-3 fatty acids/fish oil (FISH OIL-OMEGA-3 FATTY ACIDS) 300-1,000 mg capsule 1 capsule, Oral, Daily    omeprazole (PRILOSEC) 40 mg, Oral, 2 times daily before meals    ondansetron  "(ZOFRAN-ODT) 4 mg, Oral, Daily    Panax, American ginsg/B12/royl (GINSENG COMPLEX ORAL) 1 tablet, Oral, Daily    potassium 99 mg Tab 1 tablet, Oral, Daily    pyridoxine (vitamin B6) (B-6) 50 mg, Oral, Daily    rizatriptan (MAXALT-MLT) 10 mg, Oral, As needed (PRN), May repeat in 2 hours if needed    royal jelly-bee pollen-ginseng -250 mg Cap 1 tablet, Oral, Daily    sodium chloride for inhalation (SODIUM CHLORIDE 0.9%) 0.9 % nebulizer solution SMARTSI Vial(s) Via Nebulizer PRN    soy isofla/blk cohosh/mag bark (ESTROVEN ORAL) 1 tablet, Oral, Daily    soy-blk cohosh-green tea-yerba (ESTROVEN ENERGY) 56- mg Tab 1 tablet, Oral, Daily    valACYclovir (VALTREX) 1,000 mg, Oral, 3 times daily    VALERIAN ROOT ORAL 880 mg, Oral, Daily    vitamin E 400 Units, Oral, Daily    zinc gluconate 50 mg, Oral, Daily       Review of Systems:   Review of Systems   Respiratory:  Positive for cough and shortness of breath.    Cardiovascular:  Positive for chest pain.   Gastrointestinal:  Positive for abdominal pain. Negative for diarrhea.   Genitourinary:  Negative for frequency.   Musculoskeletal:  Negative for back pain.   Skin:  Negative for rash.   Neurological:  Negative for headaches.   Psychiatric/Behavioral:  The patient is not nervous/anxious.        PHYSICAL EXAM:  BP (!) 146/73 (BP Location: Left forearm, Patient Position: Sitting)   Pulse 69   Temp 99 °F (37.2 °C) (Oral)   Resp 18   Ht 5' 3" (1.6 m)   Wt 92.1 kg (203 lb 0.7 oz)   SpO2 99%   BMI 35.97 kg/m²     General Appearance:    Alert, cooperative, no distress, appears stated age   Head:    Normocephalic, without obvious abnormality, atraumatic   Throat:   Lips, mucosa, and tongue normal; teeth and gums normal   Lungs:     Clear to auscultation bilaterally, respirations unlabored   Extremities:   Extremities normal, atraumatic, no cyanosis or edema   Pulses:   2+ and symmetric all extremities   Skin:   Skin color, texture, turgor normal   Neurologic:  "  CNII-XII intact           Pertinent Labs & Imaging:  Specimen (730h ago, onward)      None            No results found for this or any previous visit (from the past 24 hour(s)).    Assessment & Plan:    1. Malignant neoplasm of upper-outer quadrant of right breast in female, estrogen receptor positive  - MRI Brain W WO Contrast; Future  - letrozole (FEMARA) 2.5 mg Tab; Take 1 tablet (2.5 mg total) by mouth once daily.  Dispense: 30 tablet; Refill: 2    2. 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  Dispense: 30 tablet; Refill: 2    3. Intractable migraine with aura without status migrainosus    4. Plantar fasciitis    5. Generalized abdominal pain      Reviewed patients referring notes, imaging and pathology. Discussed diagnosis, staging, and treatment in detail with patient.   Patient with newly diagnosed metastatic breast cancer.  Discussed case with navigation who will try to have receptors run on pathology specimen from 8/16/23 at [facility]   Discussed that if the metastatic cancer is ER+/HER2-, will plan for letrozole (AI) + Kisquali (CDK4/6). Discussed Kisquali as adjunct therapy to AI given metastatic disease. Discussed risks/ benefits of CDK4/6   If this disease is HER2+, will plan for THP x 6 followed by AI+HP  Both treatments would be given indefinitely until POD or intolerability   Plan for repeat PET imaging approximately 12 weeks from initiation of therapy    Patient complains of worsening vision and headaches over her normal migraines. Will plan for MRI brain ASAP  Additionally, she is concerned of mild uptake in proximal stomach. Reviewed prior GI notes from Dr Pride and EGD showing possible inflammation in the stomach.   She already has a referral to endocrinology for diffuse thyroid uptake    Will plan for follow up with receptor results.  Given distance, will plan for virtual follow up. She will see   [place] next week.        Route Chart for Scheduling    Med Onc Chart Routing      Follow up with physician . Virtual 7-10 days   Follow up with [facility]    Infusion scheduling note    Injection scheduling note    Labs None   Scheduling:  Preferred lab:  Lab interval:     Imaging    Pharmacy appointment    Other referrals                   Total time of this visit, including time spent face to face with patient and/or via video/audio, and also in preparing for today's visit for MDM and documentation. (Medical Decision Making, including consideration of possible diagnoses, management options, complex medical record review, review of diagnostic tests and information, consideration and discussion of significant complications based on comorbidities, and discussion with providers involved with the care of the patient) 79 minutes. Greater than 50% was spent face to face with the patient counseling and coordinating care.      Cristine Guerrero MD   09/08/2023

## 2023-09-11 ENCOUNTER — TELEPHONE (OUTPATIENT)
Dept: HEMATOLOGY/ONCOLOGY | Facility: CLINIC | Age: 58
End: 2023-09-11
Payer: COMMERCIAL

## 2023-09-15 ENCOUNTER — PATIENT MESSAGE (OUTPATIENT)
Dept: HEMATOLOGY/ONCOLOGY | Facility: CLINIC | Age: 58
End: 2023-09-15
Payer: COMMERCIAL

## 2023-09-18 ENCOUNTER — PATIENT MESSAGE (OUTPATIENT)
Dept: HEMATOLOGY/ONCOLOGY | Facility: CLINIC | Age: 58
End: 2023-09-18
Payer: COMMERCIAL

## 2023-09-18 ENCOUNTER — TELEPHONE (OUTPATIENT)
Dept: HEMATOLOGY/ONCOLOGY | Facility: CLINIC | Age: 58
End: 2023-09-18
Payer: COMMERCIAL

## 2023-09-18 NOTE — TELEPHONE ENCOUNTER
"----- Message from Ada Corby sent at 9/18/2023  4:00 PM CDT -----  Regarding: PA  Contact: [employer] requesting call back in regards to PA for MRI. [facility] stated pt is scheduled for Thursday  Please call and adv @       Confirmed contact below:   Contact Name:[facility]   Phone Number: [phone]               Additional Notes:  "Thank you for all that you do for our patients"                                       w    "

## 2023-09-19 ENCOUNTER — PATIENT MESSAGE (OUTPATIENT)
Dept: HEMATOLOGY/ONCOLOGY | Facility: CLINIC | Age: 58
End: 2023-09-19

## 2023-09-19 ENCOUNTER — OFFICE VISIT (OUTPATIENT)
Dept: HEMATOLOGY/ONCOLOGY | Facility: CLINIC | Age: 58
End: 2023-09-19
Payer: COMMERCIAL

## 2023-09-19 DIAGNOSIS — Z17.0 MALIGNANT NEOPLASM OF UPPER-OUTER QUADRANT OF RIGHT BREAST IN FEMALE, ESTROGEN RECEPTOR POSITIVE: Primary | ICD-10-CM

## 2023-09-19 DIAGNOSIS — C78.01 SECONDARY MALIGNANT NEOPLASM OF HILUS OF RIGHT LUNG: ICD-10-CM

## 2023-09-19 DIAGNOSIS — C50.411 MALIGNANT NEOPLASM OF UPPER-OUTER QUADRANT OF RIGHT BREAST IN FEMALE, ESTROGEN RECEPTOR POSITIVE: Primary | ICD-10-CM

## 2023-09-19 PROCEDURE — 99215 PR OFFICE/OUTPT VISIT, EST, LEVL V, 40-54 MIN: ICD-10-PCS | Mod: 95,,, | Performed by: INTERNAL MEDICINE

## 2023-09-19 PROCEDURE — 99215 OFFICE O/P EST HI 40 MIN: CPT | Mod: 95,,, | Performed by: INTERNAL MEDICINE

## 2023-09-19 NOTE — PROGRESS NOTES
[facility]/ [facility]   at [facility] Note:    The patient location is: home  The chief complaint leading to consultation is: breast cancer    Visit type: audiovisual    Face to Face time with patient: 30  60 minutes of total time spent on the encounter, which includes face to face time and non-face to face time preparing to see the patient (eg, review of tests), Obtaining and/or reviewing separately obtained history, Documenting clinical information in the electronic or other health record, Independently interpreting results (not separately reported) and communicating results to the patient/family/caregiver, or Care coordination (not separately reported).         Each patient to whom he or she provides medical services by telemedicine is:  (1) informed of the relationship between the physician and patient and the respective role of any other health care provider with respect to management of the patient; and (2) notified that he or she may decline to receive medical services by telemedicine and may withdraw from such care at any time.    Notes:     Chief Complaint:   Encounter Diagnoses   Name Primary?    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    HPI:  Sasha Hensley is a 58 y.o. female who presents today for follow up of newly diagnosed metastatic breast cancer. She presents virtually to discuss receptors.   Patient feels very anxious about this. She would like all of her care within the [facility] system.   She is wary about the need for a port     Oncology History  Patient initially diagnosed with left breast cancer in [place], [place] in 2018  Initially had a palpable lump  Diagnostic MMG 3/21/18: bilobed mass with calcs lateral breast 60mm and pleomorphic calcs  15h60ar  U/S: two spiculated masses 37mm in total (one [id number], other 05m16e66ms), -axilla    Bx 3/26/18: IDC, grade 2, ER 96%/PR90%/HER2 equivocal; Ki67 8%    CT C/A/P and bone scan 4/10/18 negative    Bilateral mastectomy with Dr. Wallis 4/19/18:  -Left: fibroadenoma  -Right:IDC, grade 3, 36mm; 0/3 LN+   -FISH: HER2 4.88, CEP17 3.15, equivocal (category 4)  [id number]    Oncotype high intermediate (26), declined chemotherapy (tested prior to results of TAILORx study).   Recommended for ovarian suppression and AI therapy. Patient declined. Documents from primary oncologist in [place] are available in Care Everywhere     She was considering [facility] flap reconstruction in July 2023 which prompted CT imaging for vascular review. CT demonstrated pulmonary nodules    PET 8/8/23: 2cm perihilar nodule within the DEEP with hypermetabolic uptake with an SUV of 10.9. A 1.5cm nodule within the left lower lobe is also hypermetabolic with an SUV of 8.1.  Non-mass like hypermetabolic uptake within the thyroid gland and the proximal stomach which are most likely benign.     FNA/Lymph node aspirate 8/16/23: positive for malignant cells, consistent with metastatic poorly differentiated breast carcinoma  LN, station 11L, biopsy: 1/1 lymph node positive for metastatic carcinoma     Receptors:   ER %  ME %  HER2 3+  Ki67 15%    Social History     Tobacco Use    Smoking status: Never    Smokeless tobacco: Never   Substance Use Topics    Alcohol use: Not Currently    Drug use: Never     Family History   Problem Relation Age of Onset    Multiple sclerosis Mother     Parkinsonism Mother     Multiple sclerosis Maternal Grandmother     Multiple sclerosis Maternal Aunt     Parkinsonism Father     Heart disease Father     Heart disease Son      Past Medical History:   Diagnosis Date    Anxiety     Aphasia due to closed TBI (traumatic brain injury)     Arthritis     Asthma     Bladder prolapse, female, acquired     Breast cancer      Cerebrovascular small vessel disease     HTN (hypertension)     IBS (irritable bowel syndrome)     Migraines     Mouth ulcers     Multiple sclerosis     Neoplasm of adipose tissue     Osteoporosis     Sciatica     Scoliosis      Past Surgical History:   Procedure Laterality Date    BILATERAL MASTECTOMY      TOTAL ABDOMINAL HYSTERECTOMY W/ BILATERAL SALPINGOOPHORECTOMY      partial        Patient Active Problem List   Diagnosis    Multiple sclerosis    IBS (irritable bowel syndrome)    Atrial fibrillation, chronic    Dyspnea on exertion    Abdominal swelling, generalized    MICHELLE (generalized anxiety disorder)    Generalized abdominal pain    Iron deficiency anemia    Scoliosis    Sciatica    Osteoporosis    Neoplasm of adipose tissue    Mouth ulcers    Chronic migraine    Cerebrovascular small vessel disease    Breast cancer    Bladder prolapse, female, acquired    Asthma    Arthritis    Aphasia due to closed TBI (traumatic brain injury)    Anxiety    Dysfunctional gallbladder    Bowel dysfunction    Severe recurrent major depression without psychotic features    HTN (hypertension)    Essential hypertension    Chronic migraine w/o aura w/o status migrainosus, not intractable    Seasonal allergies    Lymphedema syndrome, postmastectomy    Foot pain, right    Plantar fasciitis    Peroneal tendonitis, unspecified laterality    Onychomycosis due to dermatophyte    Neuritis    Malignant neoplasm of upper-outer quadrant of right breast in female, estrogen receptor positive    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       Current Outpatient Medications   Medication Instructions    alfalfa 433 mg, Oral, Daily    ALPRAZolam (XANAX) 2 MG Tab 1/2 tab PO qid prn anxiety    arginine 500 mg, Oral, Daily    azelastine (ASTELIN) 274 mcg, Nasal, 2 times daily    carvediloL (COREG) 6.25 mg, Oral, 2 times daily    cholecalciferol (vitamin D3) 400 Units, Oral, Daily    cyanocobalamin 500 mcg, Oral, Daily    diclofenac sodium  (VOLTAREN) 2 g, Topical (Top), 4 times daily    flaxseed oil 1,000 mg Cap 1 capsule, Oral, Daily    garlic 1,000 mg Cap 1 tablet, Oral, Daily    ginkgo biloba 60 mg Cap 1 tablet, Oral, Daily    GUM NFGXCY-TVVSXU-WLXX-ALCOHOL MISC 500 mg, Oral, Daily    HONEY ORAL 1 spray, Oral, Daily    ivermectin (STROMECTOL) 60 mg, Oral, Once    L.acid,gas,plan,rhm/B.ani/cran (UP4 PROBIOTICS WOMEN'S ORAL) 1 tablet, Oral, Daily    letrozole (FEMARA) 2.5 mg, Oral, Daily    LICORICE ROOT EXTRACT, BULK, MISC 1 tablet, Oral, Daily    magnesium 200 mg Tab 1 tablet, Oral, Daily    MAGNESIUM ORAL 500 mg, Oral, Weekly    metoclopramide HCl (REGLAN) 5 mg, Oral, 3 times daily before meals    multivit-min/folic acid/biotin (HAIR,SKIN AND NAILS,FA-BIOTIN, ORAL) 3,000 mcg, Oral, Daily    mv-min-folic acid-lutein (CENTRUM SILVER) 400-250 mcg Chew 1 tablet, Oral, Daily    mv-mn-vitC-dckRg-Eoe-Xoy-hc124 (AIRBORNE, ASCORBATE SODIUM,) 333-1.7 mg Chew 1 tablet, Oral, Daily    niacin 250 mg, Oral, Nightly    omega-3 fatty acids/fish oil (FISH OIL-OMEGA-3 FATTY ACIDS) 300-1,000 mg capsule 1 capsule, Oral, Daily    omeprazole (PRILOSEC) 40 mg, Oral, 2 times daily before meals    ondansetron (ZOFRAN-ODT) 4 mg, Oral, Daily    Panax, American ginsg/B12/royl (GINSENG COMPLEX ORAL) 1 tablet, Oral, Daily    potassium 99 mg Tab 1 tablet, Oral, Daily    pyridoxine (vitamin B6) (B-6) 50 mg, Oral, Daily    rizatriptan (MAXALT-MLT) 10 mg, Oral, As needed (PRN), May repeat in 2 hours if needed    royal jelly-bee pollen-ginseng -250 mg Cap 1 tablet, Oral, Daily    sodium chloride for inhalation (SODIUM CHLORIDE 0.9%) 0.9 % nebulizer solution SMARTSI Vial(s) Via Nebulizer PRN    soy isofla/blk cohosh/mag bark (ESTROVEN ORAL) 1 tablet, Oral, Daily    soy-blk cohosh-green tea-yerba (ESTROVEN ENERGY) 56- mg Tab 1 tablet, Oral, Daily    valACYclovir (VALTREX) 1,000 mg, Oral, 3 times daily    VALERIAN ROOT ORAL 880 mg, Oral, Daily    vitamin E 400 Units,  Oral, Daily    zinc gluconate 50 mg, Oral, Daily       Review of Systems:   Review of Systems   Respiratory:  Positive for cough and shortness of breath.    Cardiovascular:  Positive for chest pain.   Gastrointestinal:  Positive for abdominal pain. Negative for diarrhea.   Genitourinary:  Negative for frequency.   Musculoskeletal:  Negative for back pain.   Skin:  Negative for rash.   Neurological:  Negative for headaches.   Psychiatric/Behavioral:  The patient is not nervous/anxious.        PHYSICAL EXAM:  Virtual visit          Pertinent Labs & Imaging:  Specimen (730h ago, onward)      None            No results found for this or any previous visit (from the past 24 hour(s)).    Assessment & Plan:    1. Malignant neoplasm of upper-outer quadrant of right breast in female, estrogen receptor positive  - Ambulatory referral/consult to Integrative Oncology; Future  - Ambulatory referral/consult to Cardiology; Future  - Echo; Future  - CBC W/ AUTO DIFFERENTIAL; Future  - CMP; Future    2. 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 I have recommended a total of 4-6 cycles of therapy. After completion of the first 4-6 cycles, patient will continue on Trastuzumab/pertuzumab q3wk until POD. With HP maintenance, discussed adding back in letrozole    First Infusion Loading Dose of Perjeta is 840 mg, followed by 420 mg every 3 weeks for 5 cycles, (with chemo). First loading dose of Trastuzumab will be 8mg/Kg over 90 minutes followed 3 weeks later by the second Infusion of Trastuzumab 6mg/kg over 60 minutes Q 3 weeks and if tolerated, all subsequent Infusions of Trastuzumab but may be over 30  minutes      Overall side effects of chemotherapy were discussed including alopecia, immunosuppression, Neutropenia (low white count), anemia (low hemoglobin), thrombocytopenia (lowered platelets), and neuropathy from taxotere. Other side effects such as nail changes, dry eyes, mouth sores, and bone pain were discussed. The need for growth factor support was also discussed. I have recommended Neulasta 24 to 48 hours status post chemotherapy. Perjeta can be associated with a mild to severe skin rash. Rare but serious side effects such as increased risk of cardiac toxicity were discussed with Herceptin and Perjeta. Monitoring of her cardiac function with MUGAs or an echocardiogram is strongly recommended every 3 mos by NCCN guidelines while on Perjeta and Herceptin.      Patient is wary of a port and has port placement scheduled on 9/25/23. If she elects against a port, consideration of weekly Paclitaxel 3 weeks on/ 1 week off x 4-6 cycles with Trastuzumab/Pertuzumab.    Treatments would be given indefinitely until POD or intolerability   Plan for repeat PET imaging approximately 12 weeks from initiation of therapy      Phone number for Danita García: [phone]      Route Chart for Scheduling    Med Onc Chart Routing      Follow up with physician . With either myself of Dr Khoobehi ([place]) for cycle #1   Follow up with [facility]    Infusion scheduling note    Injection scheduling note    Labs CBC and CMP   Scheduling:  Preferred lab:  Lab interval:     Imaging    Pharmacy appointment    Other referrals                  Treatment Plan Information   OP BREAST DOCETAXEL TRASTUZUMAB PERTUZUMAB (THP) Q3W   Cristine Guerrero MD   Upcoming Treatment Dates - OP BREAST DOCETAXEL TRASTUZUMAB PERTUZUMAB (THP) Q3W    9/29/2023       Pre-Medications       DEXAMETHASONE ORDERABLE       Chemotherapy       pertuzumab (PERJETA) 840 mg in sodium chloride 0.9% 278 mL infusion       trastuzumab-anns (KANJINTI) 737 mg in sodium chloride 0.9%  250 mL chemo infusion       DOCEtaxel (TAXOTERE) 75 mg/m2 = 152 mg in sodium chloride 0.9% 257.6 mL chemo infusion  10/20/2023       Pre-Medications       DEXAMETHASONE ORDERABLE       Chemotherapy       pertuzumab (PERJETA) 420 mg in sodium chloride 0.9% 264 mL infusion       trastuzumab-anns (KANJINTI) 553 mg in sodium chloride 0.9% 250 mL chemo infusion       DOCEtaxel (TAXOTERE) 75 mg/m2 = 152 mg in sodium chloride 0.9% 257.6 mL chemo infusion  11/10/2023       Pre-Medications       DEXAMETHASONE ORDERABLE       Chemotherapy       pertuzumab (PERJETA) 420 mg in sodium chloride 0.9% 264 mL infusion       trastuzumab-anns (KANJINTI) 553 mg in sodium chloride 0.9% 250 mL chemo infusion       DOCEtaxel (TAXOTERE) 75 mg/m2 = 152 mg in sodium chloride 0.9% 257.6 mL chemo infusion  12/1/2023       Pre-Medications       DEXAMETHASONE ORDERABLE       Chemotherapy       pertuzumab (PERJETA) 420 mg in sodium chloride 0.9% 264 mL infusion       trastuzumab-anns (KANJINTI) 553 mg in sodium chloride 0.9% 250 mL chemo infusion       DOCEtaxel (TAXOTERE) 75 mg/m2 = 152 mg in sodium chloride 0.9% 257.6 mL chemo infusion        Cristine Guerrero MD   09/20/2023

## 2023-09-20 ENCOUNTER — DOCUMENTATION ONLY (OUTPATIENT)
Dept: HEMATOLOGY/ONCOLOGY | Facility: CLINIC | Age: 58
End: 2023-09-20
Payer: COMMERCIAL

## 2023-09-20 ENCOUNTER — TELEPHONE (OUTPATIENT)
Dept: HEMATOLOGY/ONCOLOGY | Facility: CLINIC | Age: 58
End: 2023-09-20
Payer: COMMERCIAL

## 2023-09-20 DIAGNOSIS — Z17.0 MALIGNANT NEOPLASM OF UPPER-OUTER QUADRANT OF RIGHT BREAST IN FEMALE, ESTROGEN RECEPTOR POSITIVE: Primary | ICD-10-CM

## 2023-09-20 DIAGNOSIS — C50.411 MALIGNANT NEOPLASM OF UPPER-OUTER QUADRANT OF RIGHT BREAST IN FEMALE, ESTROGEN RECEPTOR POSITIVE: Primary | ICD-10-CM

## 2023-09-20 PROBLEM — C78.01: Status: ACTIVE | Noted: 2023-09-20

## 2023-09-20 NOTE — PROGRESS NOTES
SW received referral for financial assistance and assistance obtaining a letter from  Dr. Guerrero stating her dx to submit to her mortgage company. SW contacted the patient to discuss the referral. Patient reports she has metastatic breast cancer and she needs the above requested information. The patient reports her partner, Delaney is assisting her with taking care of all of her paperwork. The patient reports she has not been feeling well and she has accepted that she is going to die but until then she is in the process of creating a non-profit organization called [employer]. The patient reports she needs all types of financial assistance because can no longer work. ARTURO informed the patient that ARTURO would send her links to financial assistance organizations that assist BC patients. Patient also reported she would like to speak with someone in [facility] because she has really been going through a hard time with her dx. ARTURO informed the patient's nurse Martin to pace a referral for [facility]. SW answered and addressed all questions and concerns. Patient verbalized understanding. SW will continue to follow. SW continues to be available as needed.     WM Copeland, [facility]  [place]cology Social Worker   [facility] v[facility]ana - Oncology  [phone]

## 2023-09-20 NOTE — NURSING
Oncology Navigation   Intake  Cancer Type: Breast (Breast CA)  Date of Referral: 09/19/23 (Dr Cristine Guerrero)  Initial Nurse Navigator Contact: 09/20/23  Referral to Initial Contact Timeline (days): 1  Date Worked: 09/20/23  First Appointment Available: 10/03/23  Appointment Date: 10/03/23 (Dr Khoobehi)  First Available Date vs. Scheduled Date (days): 0     Treatment  Current Status: Staging work-up       Medical Oncologist: Dr Aurash Khoobehi  Consult Date: 10/03/23             ER: Positive  KY: Positive  Her2: Postive           Acuity      Follow Up  No follow-ups on file.

## 2023-09-20 NOTE — TELEPHONE ENCOUNTER
----- Message from Yara Zavala sent at 9/19/2023  9:08 AM CDT -----  Type: Needs Medical Advice  Who Called:  Patient   Symptoms (please be specific):    How long has patient had these symptoms:    Pharmacy name and phone #:    Best Call Back Number: [phone]  Additional Information: Patient is requesting a call back in regards to transferring her care from [facility] to [facility].

## 2023-09-20 NOTE — PLAN OF CARE
START ON PATHWAY REGIMEN - Breast    [id number]        Pertuzumab (Perjeta)       Trastuzumab-xxxx       Docetaxel (Taxotere)       Pertuzumab (Perjeta)       Trastuzumab-xxxx       Docetaxel (Taxotere)           Additional Orders: Docetaxel may be given for 6-8 cycles; per Pineda et   al., no significant PFS or OS improvements were seen beyond 6 cycles (2017).   Assess LVEF prior to initiation of trastuzumab and pertuzumab and as clinically   indicated during and after treatment. See PI for details. Refer to PI for   instructions regarding delayed or missed doses of pertuzumab and trastuzumab.    **Always confirm dose/schedule in your pharmacy ordering system**    Patient Characteristics:  Distant Metastases or Locoregional Recurrent Disease - Unresected or Locally   Advanced Unresectable Disease Progressing after Neoadjuvant and Local Therapies,   HER2 Positive, ER Positive, Chemotherapy + HER2-Targeted Therapy, First Line  Therapeutic Status: Distant Metastases  HER2 Status: Positive (+)  ER Status: Positive (+)  AR Status: Positive (+)  Line of Therapy: First Line  Intent of Therapy:  Curative Intent, Discussed with Patient

## 2023-09-21 ENCOUNTER — TELEPHONE (OUTPATIENT)
Dept: HEMATOLOGY/ONCOLOGY | Facility: CLINIC | Age: 58
End: 2023-09-21
Payer: COMMERCIAL

## 2023-09-21 NOTE — TELEPHONE ENCOUNTER
I called and spoke to pt in re: to psych referral placed by Dr. Guerrero. Pt confirmed for a virtual visit on 9/29 at 1p. She had no further questions.    ONOFRE [place] Ext. [phone]

## 2023-09-25 ENCOUNTER — PATIENT MESSAGE (OUTPATIENT)
Dept: HEMATOLOGY/ONCOLOGY | Facility: CLINIC | Age: 58
End: 2023-09-25
Payer: COMMERCIAL

## 2023-09-26 ENCOUNTER — TELEPHONE (OUTPATIENT)
Dept: HEMATOLOGY/ONCOLOGY | Facility: CLINIC | Age: 58
End: 2023-09-26
Payer: COMMERCIAL

## 2023-09-27 ENCOUNTER — TELEPHONE (OUTPATIENT)
Dept: HEMATOLOGY/ONCOLOGY | Facility: CLINIC | Age: 58
End: 2023-09-27
Payer: COMMERCIAL

## 2023-09-28 ENCOUNTER — TELEPHONE (OUTPATIENT)
Dept: HEMATOLOGY/ONCOLOGY | Facility: CLINIC | Age: 58
End: 2023-09-28
Payer: COMMERCIAL

## 2023-09-29 ENCOUNTER — OFFICE VISIT (OUTPATIENT)
Dept: PSYCHIATRY | Facility: CLINIC | Age: 58
End: 2023-09-29
Payer: COMMERCIAL

## 2023-09-29 DIAGNOSIS — F41.1 GAD (GENERALIZED ANXIETY DISORDER): Primary | ICD-10-CM

## 2023-09-29 DIAGNOSIS — Z17.0 MALIGNANT NEOPLASM OF UPPER-OUTER QUADRANT OF RIGHT BREAST IN FEMALE, ESTROGEN RECEPTOR POSITIVE: ICD-10-CM

## 2023-09-29 DIAGNOSIS — F43.21 ADJUSTMENT DISORDER WITH DEPRESSED MOOD: ICD-10-CM

## 2023-09-29 DIAGNOSIS — C50.411 MALIGNANT NEOPLASM OF UPPER-OUTER QUADRANT OF RIGHT BREAST IN FEMALE, ESTROGEN RECEPTOR POSITIVE: ICD-10-CM

## 2023-09-29 PROCEDURE — 90791 PSYCH DIAGNOSTIC EVALUATION: CPT | Mod: 95,,, | Performed by: CASE MANAGER/CARE COORDINATOR

## 2023-09-29 PROCEDURE — 90791 PR PSYCHIATRIC DIAGNOSTIC EVALUATION: ICD-10-PCS | Mod: 95,,, | Performed by: CASE MANAGER/CARE COORDINATOR

## 2023-09-29 NOTE — Clinical Note
Please contact patient to schedule follow-up for 1-2 weeks and then a couple more sessions after on weekly basis. Thank you.
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
## 2023-10-02 ENCOUNTER — PATIENT MESSAGE (OUTPATIENT)
Dept: HEMATOLOGY/ONCOLOGY | Facility: CLINIC | Age: 58
End: 2023-10-02
Payer: COMMERCIAL

## 2023-10-02 ENCOUNTER — OFFICE VISIT (OUTPATIENT)
Dept: CARDIOLOGY | Facility: CLINIC | Age: 58
End: 2023-10-02
Payer: COMMERCIAL

## 2023-10-02 ENCOUNTER — PATIENT MESSAGE (OUTPATIENT)
Dept: ADMINISTRATIVE | Facility: OTHER | Age: 58
End: 2023-10-02
Payer: COMMERCIAL

## 2023-10-02 VITALS
OXYGEN SATURATION: 99 % | DIASTOLIC BLOOD PRESSURE: 98 MMHG | BODY MASS INDEX: 35.64 KG/M2 | SYSTOLIC BLOOD PRESSURE: 165 MMHG | WEIGHT: 201.13 LBS | HEART RATE: 61 BPM | HEIGHT: 63 IN

## 2023-10-02 DIAGNOSIS — C78.01 SECONDARY MALIGNANT NEOPLASM OF HILUS OF RIGHT LUNG: ICD-10-CM

## 2023-10-02 DIAGNOSIS — Z86.73 HISTORY OF TIA (TRANSIENT ISCHEMIC ATTACK): ICD-10-CM

## 2023-10-02 DIAGNOSIS — Z82.49 FAMILY HISTORY OF ATRIAL FIBRILLATION: ICD-10-CM

## 2023-10-02 DIAGNOSIS — I48.0 PAF (PAROXYSMAL ATRIAL FIBRILLATION): ICD-10-CM

## 2023-10-02 DIAGNOSIS — Z53.20 ANTICOAGULANT MEDICATION DECLINED BY PATIENT: ICD-10-CM

## 2023-10-02 DIAGNOSIS — G35 MULTIPLE SCLEROSIS: ICD-10-CM

## 2023-10-02 DIAGNOSIS — Z17.0 MALIGNANT NEOPLASM OF UPPER-OUTER QUADRANT OF RIGHT BREAST IN FEMALE, ESTROGEN RECEPTOR POSITIVE: ICD-10-CM

## 2023-10-02 DIAGNOSIS — Z76.89 ENCOUNTER TO ESTABLISH CARE: ICD-10-CM

## 2023-10-02 DIAGNOSIS — I10 PRIMARY HYPERTENSION: ICD-10-CM

## 2023-10-02 DIAGNOSIS — Z78.9 MEDICATION INTOLERANCE: ICD-10-CM

## 2023-10-02 DIAGNOSIS — Z82.49 FAMILY HISTORY OF HEART FAILURE: Primary | ICD-10-CM

## 2023-10-02 DIAGNOSIS — C50.411 MALIGNANT NEOPLASM OF UPPER-OUTER QUADRANT OF RIGHT BREAST IN FEMALE, ESTROGEN RECEPTOR POSITIVE: ICD-10-CM

## 2023-10-02 DIAGNOSIS — Z91.89 CARDIOVASCULAR EVENT RISK: ICD-10-CM

## 2023-10-02 DIAGNOSIS — R07.2 PRECORDIAL CHEST PAIN: ICD-10-CM

## 2023-10-02 DIAGNOSIS — E66.01 CLASS 2 SEVERE OBESITY DUE TO EXCESS CALORIES WITH SERIOUS COMORBIDITY AND BODY MASS INDEX (BMI) OF 35.0 TO 35.9 IN ADULT: ICD-10-CM

## 2023-10-02 DIAGNOSIS — R06.02 SOB (SHORTNESS OF BREATH): ICD-10-CM

## 2023-10-02 DIAGNOSIS — G89.4 CHRONIC PAIN SYNDROME: ICD-10-CM

## 2023-10-02 PROBLEM — E66.812 CLASS 2 SEVERE OBESITY DUE TO EXCESS CALORIES WITH SERIOUS COMORBIDITY AND BODY MASS INDEX (BMI) OF 35.0 TO 35.9 IN ADULT: Status: ACTIVE | Noted: 2023-10-02

## 2023-10-02 PROCEDURE — 99999 PR PBB SHADOW E&M-EST. PATIENT-LVL V: CPT | Mod: PBBFAC,,, | Performed by: INTERNAL MEDICINE

## 2023-10-02 PROCEDURE — 3008F BODY MASS INDEX DOCD: CPT | Mod: S$GLB,,, | Performed by: INTERNAL MEDICINE

## 2023-10-02 PROCEDURE — 3077F SYST BP >= 140 MM HG: CPT | Mod: S$GLB,,, | Performed by: INTERNAL MEDICINE

## 2023-10-02 PROCEDURE — 1159F MED LIST DOCD IN RCRD: CPT | Mod: S$GLB,,, | Performed by: INTERNAL MEDICINE

## 2023-10-02 PROCEDURE — 3077F PR MOST RECENT SYSTOLIC BLOOD PRESSURE >= 140 MM HG: ICD-10-PCS | Mod: S$GLB,,, | Performed by: INTERNAL MEDICINE

## 2023-10-02 PROCEDURE — 3080F PR MOST RECENT DIASTOLIC BLOOD PRESSURE >= 90 MM HG: ICD-10-PCS | Mod: S$GLB,,, | Performed by: INTERNAL MEDICINE

## 2023-10-02 PROCEDURE — 99999 PR PBB SHADOW E&M-EST. PATIENT-LVL V: ICD-10-PCS | Mod: PBBFAC,,, | Performed by: INTERNAL MEDICINE

## 2023-10-02 PROCEDURE — 99205 PR OFFICE/OUTPT VISIT, NEW, LEVL V, 60-74 MIN: ICD-10-PCS | Mod: 25,S$GLB,, | Performed by: INTERNAL MEDICINE

## 2023-10-02 PROCEDURE — 3080F DIAST BP >= 90 MM HG: CPT | Mod: S$GLB,,, | Performed by: INTERNAL MEDICINE

## 2023-10-02 PROCEDURE — 99205 OFFICE O/P NEW HI 60 MIN: CPT | Mod: 25,S$GLB,, | Performed by: INTERNAL MEDICINE

## 2023-10-02 PROCEDURE — 93000 EKG 12-LEAD: ICD-10-PCS | Mod: S$GLB,,, | Performed by: INTERNAL MEDICINE

## 2023-10-02 PROCEDURE — 93000 ELECTROCARDIOGRAM COMPLETE: CPT | Mod: S$GLB,,, | Performed by: INTERNAL MEDICINE

## 2023-10-02 PROCEDURE — 1159F PR MEDICATION LIST DOCUMENTED IN MEDICAL RECORD: ICD-10-PCS | Mod: S$GLB,,, | Performed by: INTERNAL MEDICINE

## 2023-10-02 PROCEDURE — 3008F PR BODY MASS INDEX (BMI) DOCUMENTED: ICD-10-PCS | Mod: S$GLB,,, | Performed by: INTERNAL MEDICINE

## 2023-10-02 RX ORDER — AMLODIPINE BESYLATE 5 MG/1
5 TABLET ORAL
COMMUNITY
Start: 2023-07-24

## 2023-10-02 RX ORDER — RIZATRIPTAN BENZOATE 10 MG/1
TABLET ORAL DAILY PRN
COMMUNITY
Start: 2023-08-31

## 2023-10-02 RX ORDER — ALPRAZOLAM 1 MG/1
1 TABLET ORAL
COMMUNITY
Start: 2023-09-08

## 2023-10-02 RX ORDER — SEMAGLUTIDE 0.68 MG/ML
INJECTION, SOLUTION SUBCUTANEOUS
COMMUNITY
Start: 2023-06-07

## 2023-10-02 RX ORDER — ALBUTEROL SULFATE 0.83 MG/ML
2.5 SOLUTION RESPIRATORY (INHALATION) EVERY 6 HOURS PRN
COMMUNITY
Start: 2023-08-18

## 2023-10-02 RX ORDER — APIXABAN 5 MG/1
5 TABLET, FILM COATED ORAL 2 TIMES DAILY
COMMUNITY
Start: 2023-08-31

## 2023-10-02 NOTE — PATIENT INSTRUCTIONS
Recommended Mediterranean dietEating Heart-Healthy Food: Using the DASH Plan  Eating for your heart doesnt have to be hard or boring. You just need to know how to make healthier choices. The DASH eating plan has been developed to help you do just that. DASH stands for Dietary Approaches to Stop Hypertension. It is a plan that has been proven to be healthier for your heart and to lower your risk for high blood pressure. It can also help lower your risk for cancer, heart disease, osteoporosis, and diabetes.  Choosing from Each Food Group  Choose foods from each of the food groups below each day. Try to get the recommended number of servings for each food group. The serving numbers are based on a diet of 2,000 calories a day. Talk to your doctor if youre unsure about your calorie needs.  Grains   Servings: 7-8 a day  A serving is:  1 slice bread  1 ounce dry cereal  half a cup cooked rice or pasta  Best choices: Whole grains and any grains high in fiber.  Vegetables   Servings: 4-5 a day  A serving is:  1 cup raw leafy vegetable  Half a cup cooked vegetable  Three-quarter cup vegetable juice  Best choices: Fresh or frozen vegetable prepared without too much added salt or fat.    Fruits   Servings: 4-5 a day  A serving is:  Three-quarter cup fruit juice  1 medium fruit  One-quarter cup dried fruit  One-half cup fresh, frozen, or canned fruit  Best choices: A variety of fresh fruits of different colors. Whole fruits are a much better choice than fruit juices.  Low-fat or Fat Free Dairy   Servings: 2-3 a day  A serving is:  8 ounces milk  1 cup yogurt  One and a half ounces cheese  Best choices: Skim or 1% milk, low-fat or fat free yogurt or buttermilk, and low-fat cheeses.       Meat, Poultry, Fish   Servings: 2 or fewer a day  A serving is:  3 ounces cooked meat, poultry, or fish  Best choices: Lean meats and fish. Trim away visible fat. Broil, roast, or boil instead of frying. Remove skin from poultry before eating.   Nuts, Seeds, Beans   Servings: 4-5 a week  A serving is:  One third cup nuts (or one and a half ounces)  2 tablespoons sunflower seeds  Half a cup cooked beans  Best choices: Dry roasted nuts with no salt added, lentils, kidney beans, garbanzo beans, and whole fernandez beans.    Fats and Oils   Servings: 2 a day  A serving is:  1 teaspoon vegetable oil  1 teaspoon soft margarine  1 tablespoon low-fat mayonnaise  1 teaspoon regular mayonnaise  2 tablespoons light salad dressing  1 tablespoon regular salad dressing  Best choices: Monounsaturated and polyunsaturated fats such as olive, canola, or safflower oil.  Sweets   Servings: 5 a week or fewer  A serving is:  1 tablespoon sugar, maple syrup, or honey  1 tablespoon jam or jelly  1 half-ounce jelly beans (about 15)  8 ounces lemonade  Best choices: Dried fruit can be a satisfying sweet. Choose low-fat sweets when possible. And watch your serving sizes!       Aerobic Exercise for a Healthy Heart  Exercise is a lot more than an energy booster and a stress reliever. It also strengthens your heart muscle, lowers your blood pressure and blood cholesterol, and burns calories.      Remember, some activity is better than none.     Choose an Aerobic Activity  Choose a nonstop activity that makes your heart and lungs work harder than they do when you rest or walk normally. This aerobic exercise can improve the way your heart and other muscles use oxygen. Make it fun by exercising with a friend and choosing an activity you enjoy. Here are some ideas:  Walking  Swimming  Bicycling  Stair climbing  Dancing  Jogging  Exercise Regularly  If you havent been exercising regularly,  get your doctors okay first. Then start slowly.  Here are some tips:  Begin exercising 3 times a week for 5-10 minutes at a time.  When you feel comfortable, add a few minutes each week.  Slowly build up to exercising 3-4 times each week for 20-40 minutes. Aim for a total of 150 or more minutes a  week.  Be sure to carry your nitroglycerin with you when you exercise.  If you get angina when youre exercising, stop what youre doing, take your nitroglycerin, and call your doctor.  © 6984-3784 [employer], [address] [place] [address], [place] [address]. All rights reserved. This information is not intended as a substitute for professional medical care. Always follow your healthcare professional's instructions.    Losing Weight (Cardiovascular)  Excess weight is a major risk factor for heart disease. Losing weight may help keep your arteries open so that your heart can get the oxygen-rich blood it needs. Weight loss can also help lower your blood pressure and reduce your risk for diabetes. All in all, losing weight makes you healthier.          Exercise with a friend. When activity is fun, you're more likely to stick with it.        Calories and Weight Loss  Calories are the fuel your body burns for energy. You get the calories you need from the food you eat. For healthy weight loss, women should eat at least 1,200 calories a day, men at least 1,500.    When you eat more calories than you need, your body stores the extra calories as fat. One pound of fat equals 3,500 calories.    To lose weight, try to burn 500 calories a day more than you eat. To do this, eat 250 calories less each day. Add activity to burn the other 250 calories. Walking 21/2 miles burns about 250 calories.    Eat a variety of healthy foods. Its the best way to make calories count.     Tips for losing weight:  Drink 8 to 10 glasses of water a day.    Dont skip meals. Instead, eat smaller portions.       Brisk Activity Is Best  Brisk activity gets your heart pumping faster. It makes your heart healthier. Its also the best way to burn calories. In fact, your body may keep burning calories for hours after you stop a brisk activity.    Begin by walking 10 minutes most days.    Add more time and speed to your walk. Build up as you feel  able.    Try to walk briskly at least 30 minutes most days. If needed, you can break this into 2 shorter sessions.     Check off the ideas below that you could try to make your day more active:    Take the stairs instead of the elevator.    Park your car farther away and walk.    Ride a bike to work or to the store.    Walk laps around the mall.

## 2023-10-02 NOTE — PROGRESS NOTES
"Subjective:    Patient ID:  Sahsa Hensley is a 58 y.o. female who presents for evaluation of Establish Care  Referred by Heme/Onc: Cristine Guerrero MD for pre-chemo CV evaluation with history of weak heart and PAF, HTN  Prior cardiologist: seen once in [facility], Dr. Guajardo, same issue. 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     Social Determinate of Health: Do you have any problem with the following: finance have health   Health Literacy (understanding): medium-low  Vaccination: up to date yes, covid vaccine no, covid infection 2022, cough possible lasting affect, now with dx of breast CA with mets.  Activities: walks a mile every day, bending over is getting hard with SOB  Nicotine: non-smoker  Alcohol: How often? None  Illicit Drugs: none  Cardiac Symptoms: chest pain, fatigue, SOB  Home BP: daily, 167/80 with chronic pains  Medication Compliance: no, tried Elquis for a week and did not like how it make her feel, no specifics  Diet: Complete cancer diet  Caffeine: Coffee 1 cup a day   Labs: No results found for: "TSH"   No results found for: "LABA1C", "HGBA1C"    Lab Results   Component Value Date    WBC 8.6 06/12/2020    HGB 13.2 06/12/2020    HCT 39.5 06/12/2020    MCV 87.2 06/12/2020     06/12/2020       CMP  Sodium   Date Value Ref Range Status   09/30/2021 140 136 - 147 mmol/L Final     Potassium   Date Value Ref Range Status   09/30/2021 4.1 3.5 - 5.1 mmol/L Final     Chloride   Date Value Ref Range Status   09/30/2021 107 98 - 107 mmol/L Final     CO2   Date Value Ref Range Status   09/30/2021 26 20 - 31 mmol/L Final     BUN   Date Value Ref Range Status   09/30/2021 8 (L) 9 - 23 mg/dL Final     Creatinine   Date Value Ref Range Status   09/30/2021 0.67 0.55 - 1.02 mg/dL Final     Calcium   Date Value Ref Range Status " "  09/30/2021 10.2 8.3 - 10.6 mg/dL Final     Albumin Level   Date Value Ref Range Status   02/12/2020 4.9 (H) 3.2 - 4.8 g/dL Final     Alk Phos   Date Value Ref Range Status   02/12/2020 64 46 - 116 IU/L Final     Aspartate Aminotransferase   Date Value Ref Range Status   02/12/2020 26 <34 IU/L Final     Alanine Aminotransferase   Date Value Ref Range Status   02/12/2020 19 10 - 49 IU/L Final     eGFR    Date Value Ref Range Status   09/30/2021 >90 >90 mL/min/1.73m2 Final     Comment:     CKD-EPI GFR INTERPRETATION GUIDELINES  Estimated using CKD-EPI equation based on standardized serum creatinine, age, and sex    DESCRIPTION               eGFR mL/min/1.73m2  Mild Decrease                           60 - 89  Moderate Decrease                       30 - 59  Severe Decrease                         15 - 29  Kidney Failure                          <15 (or dialysis)     eGFR   Date Value Ref Range Status   09/30/2021 >90 >90 mL/min/1.73m2 Final     @labrcntip(troponini)@  No results found for: "BNP"}   Lab Results   Component Value Date    CHOL 212 (A) 06/21/2016     Lab Results   Component Value Date    HDL 73 06/21/2016     Lab Results   Component Value Date    LDLCALC 120 06/21/2016     Lab Results   Component Value Date    TRIG 94 06/21/2016     No results found for: "CHOLHDL"      Outside labs reviewed:  FBS 8/2023 105, normal H&H, CMP    Last Echo: none since 2018  Last stress test: none  Cardiovascular angiogram: none  ECG: SB with PAC, 59   Fundoscopic exam: due    WF with family history for heart failure and PAF. Report history of TIA starting at age 13, dx at age 25, treated with Valium and last occurrence about a year ago. Very high [id number] score of 4 and decline Eliquis. Told of "weak heart" prior to [facility] but no regular cardiology follow up. Noted sharp right-sided CP, squeezing mid-chest pains. Have chronic pains. Echo pending. Noted new SOB since 2023. New diagnosis of metastatic " "breast CA in 8/2023.    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 Monitoring of her cardiac function with MUGAs or an echocardiogram is strongly recommended every 3 mos by NCCN guidelines while on Perjeta and Herceptin."         Review of Systems   Constitutional: Positive for chills and fever. Negative for diaphoresis, malaise/fatigue, night sweats and weight gain.   HENT:  Negative for hearing loss, nosebleeds and tinnitus.    Eyes:  Negative for discharge and visual disturbance.   Cardiovascular:  Positive for chest pain, dyspnea on exertion and palpitations. Negative for claudication, cyanosis, irregular heartbeat, leg swelling, near-syncope, orthopnea and paroxysmal nocturnal dyspnea.   Respiratory:  Positive for cough and shortness of breath. Negative for sleep disturbances due to breathing, snoring and wheezing.         Wynne score 0   Endocrine: Positive for polyuria. Negative for polydipsia.   Hematologic/Lymphatic: Does not bruise/bleed easily.   Skin:  Positive for rash. Negative for color change, flushing, nail changes, poor wound healing and suspicious lesions.   Musculoskeletal:  Positive for back pain, falls and joint pain. Negative for arthritis, gout, joint swelling, muscle cramps, muscle weakness, myalgias and neck pain.   Gastrointestinal:  Positive for abdominal pain, heartburn and melena. Negative for constipation, diarrhea, hematemesis, hematochezia, nausea and vomiting.   Genitourinary:  Negative for dysuria and hematuria.   Neurological:  Positive for headaches and " paresthesias. Negative for disturbances in coordination, excessive daytime sleepiness, dizziness, focal weakness, light-headedness, loss of balance, numbness, vertigo and weakness.   Psychiatric/Behavioral:  Negative for depression and substance abuse. The patient is nervous/anxious. The patient does not have insomnia.      Answers submitted by the patient for this visit:  Review of Systems Questionnaire (Submitted on 9/28/2023)  activity change: Yes, harder to move around due to SOB  unexpected weight change: No  neck pain: No  hearing loss: No  rhinorrhea: No  trouble swallowing: No  eye discharge: No  visual disturbance: No  chest tightness: Yes  wheezing: No  chest pain: Yes  palpitations: Yes  blood in stool: No  constipation: No  vomiting: No  diarrhea: No  polydipsia: No  polyuria: Yes  difficulty urinating: No  hematuria: No  menstrual problem: No  dysuria: No  joint swelling: No  arthralgias: No  headaches: No  weakness: No  confusion: No  dysphoric mood: No     Objective:    Physical Exam  Constitutional:       Appearance: She is well-developed.      Comments: RA O2 sat 99%   HENT:      Head: Normocephalic.   Eyes:      Conjunctiva/sclera: Conjunctivae normal.      Pupils: Pupils are equal, round, and reactive to light.   Neck:      Thyroid: No thyromegaly.      Vascular: No JVD.   Cardiovascular:      Rate and Rhythm: Normal rate. Rhythm irregular.      Pulses: Intact distal pulses.           Carotid pulses are 1+ on the right side and 1+ on the left side.       Radial pulses are 1+ on the right side and 1+ on the left side.        Dorsalis pedis pulses are 1+ on the right side and 1+ on the left side.        Posterior tibial pulses are 1+ on the right side and 1+ on the left side.      Heart sounds: Normal heart sounds. No murmur heard.     No friction rub. No gallop.      Comments: Some dry cough  Pulmonary:      Effort: Pulmonary effort is normal.      Breath sounds: No rales.      Comments: Diminished  breath sounds.       Chest:      Chest wall: No tenderness.   Abdominal:      General: Bowel sounds are normal.      Palpations: Abdomen is soft.      Tenderness: There is no abdominal tenderness.   Musculoskeletal:         General: Normal range of motion.      Cervical back: Normal range of motion and neck supple.   Lymphadenopathy:      Cervical: No cervical adenopathy.   Skin:     General: Skin is warm and dry.      Findings: No rash.   Neurological:      Mental Status: She is alert and oriented to person, place, and time.           Assessment:       1. Family history of heart failure    2. Encounter to establish care    3. Malignant neoplasm of upper-outer quadrant of right breast in female, estrogen receptor positive    4. 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 Multiple sclerosis    6. SOB (shortness of breath)    7. PAF (paroxysmal atrial fibrillation), dx 2004    8. Family history of atrial fibrillation    9. Primary hypertension    10. Chronic pain syndrome    11. Medication intolerance, Eliquis do not like to take any medications.    12. Precordial chest pain    13. Class 2 severe obesity due to excess calories with serious comorbidity and body mass index (BMI) of 35.0 to 35.9 in adult    14. Cardiovascular event risk, ASCVD 10-year risk 5%, 2016    15. History of TIA (transient ischemic attack)    16. Anticoagulant medication declined by patient         Plan:       Sasha was seen today for establish care.    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    Precordial chest pain    Class 2 severe obesity due to excess calories with serious comorbidity and body mass index (BMI) of 35.0 to 35.9 in adult    Cardiovascular event risk, ASCVD 10-year risk 5%, 2016    History of TIA (transient ischemic attack)  -     Holter monitor - 48 hour; Future    Anticoagulant medication declined by patient    - All medical issues reviewed, continue current Rx.  - Warning signs of MI and stroke given, if symptoms last more than 5 minutes, stop immediately and call 911, then chew 2-4 low-dose ASA (81 mg).   - CV status and all medications reviewed, patient acknowledge good understanding.  - Recommend healthy living: healthy diet and regular exercise aiming for fitness, restorative sleep and weight control  - Need good exercise program, 4 key elements: 1. Aerobic (walking, swimming, dancing, jogging, biking, etc, 2. Muscle strengthening / resistance exercise, need to do 2-3 times weekly, 3. Stretching daily, good stretch takes a whole  total minute. 4. Balance exercise daily.  - Encourage activities as much as tolerated. Any activity is better than none!    - Instruction for Mediterranean diet and heart healthy exercise given.  - Check home blood pressure, 2 days weekly, do 2 readings within 5 minutes in AM and PM, keep log for review. Target resting BP is less than 130/80.   - Weigh twice weekly, try to lose 1-2 lbs per week. Target weight loss of 5%-10%.  - Highly recommend 30-60 minutes of exercise / activities daily, can have Sunday off, with 2-3 sessions of muscle strengthening weekly. A  would be very helpful.  - Recommend at least annual cardiovascular evaluation in view of patient's significant risk  factors.  - Phone review / encourage use of [facility]     Total time spend including review of record prior to face-to-face visit is 60 minutes. Greater than 50% of the time was spent in counseling and coordination of care. The above assessment and plan have been discussed at length. Referring provider's note reviewed. Labs and procedure over the last 6 months reviewed. Problem List updated. Asked to bring in all active medications / pills bottles with next visit. Will send note to referring / PCP.

## 2023-10-03 ENCOUNTER — HOSPITAL ENCOUNTER (OUTPATIENT)
Dept: CARDIOLOGY | Facility: HOSPITAL | Age: 58
Discharge: HOME | End: 2023-10-03
Attending: INTERNAL MEDICINE
Payer: COMMERCIAL

## 2023-10-03 ENCOUNTER — OFFICE VISIT (OUTPATIENT)
Dept: HEMATOLOGY/ONCOLOGY | Facility: CLINIC | Age: 58
End: 2023-10-03
Payer: COMMERCIAL

## 2023-10-03 ENCOUNTER — PATIENT MESSAGE (OUTPATIENT)
Dept: HEMATOLOGY/ONCOLOGY | Facility: CLINIC | Age: 58
End: 2023-10-03
Payer: COMMERCIAL

## 2023-10-03 VITALS
BODY MASS INDEX: 34.15 KG/M2 | HEIGHT: 64 IN | HEIGHT: 64 IN | SYSTOLIC BLOOD PRESSURE: 153 MMHG | TEMPERATURE: 98 F | BODY MASS INDEX: 34.15 KG/M2 | RESPIRATION RATE: 18 BRPM | WEIGHT: 200 LBS | WEIGHT: 200 LBS | DIASTOLIC BLOOD PRESSURE: 98 MMHG | OXYGEN SATURATION: 99 % | HEART RATE: 61 BPM

## 2023-10-03 DIAGNOSIS — I48.0 PAF (PAROXYSMAL ATRIAL FIBRILLATION): ICD-10-CM

## 2023-10-03 DIAGNOSIS — I48.20 ATRIAL FIBRILLATION, CHRONIC: Primary | ICD-10-CM

## 2023-10-03 DIAGNOSIS — C78.01 SECONDARY MALIGNANT NEOPLASM OF HILUS OF RIGHT LUNG: ICD-10-CM

## 2023-10-03 DIAGNOSIS — I48.0 PAF (PAROXYSMAL ATRIAL FIBRILLATION): Primary | ICD-10-CM

## 2023-10-03 DIAGNOSIS — C50.411 MALIGNANT NEOPLASM OF UPPER-OUTER QUADRANT OF RIGHT BREAST IN FEMALE, ESTROGEN RECEPTOR POSITIVE: Primary | ICD-10-CM

## 2023-10-03 DIAGNOSIS — Z17.0 MALIGNANT NEOPLASM OF UPPER-OUTER QUADRANT OF RIGHT BREAST IN FEMALE, ESTROGEN RECEPTOR POSITIVE: Primary | ICD-10-CM

## 2023-10-03 LAB
AORTIC ROOT ANNULUS: 2.65 CM
AORTIC VALVE CUSP SEPERATION: 1.67 CM
ASCENDING AORTA: 2.58 CM
AV INDEX (PROSTH): 0.65
AV MEAN GRADIENT: 4 MMHG
AV PEAK GRADIENT: 7 MMHG
AV VALVE AREA BY VELOCITY RATIO: 1.9 CM²
AV VALVE AREA: 2 CM²
AV VELOCITY RATIO: 0.62
BSA FOR ECHO PROCEDURE: 2.02 M2
CV ECHO LV RWT: 0.41 CM
DOP CALC AO PEAK VEL: 1.36 M/S
DOP CALC AO VTI: 30.6 CM
DOP CALC LVOT AREA: 3.1 CM2
DOP CALC LVOT DIAMETER: 1.98 CM
DOP CALC LVOT PEAK VEL: 0.84 M/S
DOP CALC LVOT STROKE VOLUME: 61.24 CM3
DOP CALCLVOT PEAK VEL VTI: 19.9 CM
E WAVE DECELERATION TIME: 219.04 MSEC
E/A RATIO: 0.95
E/E' RATIO: 7.89 M/S
ECHO LV POSTERIOR WALL: 0.83 CM (ref 0.6–1.1)
FRACTIONAL SHORTENING: 32 % (ref 28–44)
INTERVENTRICULAR SEPTUM: 0.85 CM (ref 0.6–1.1)
LA MAJOR: 3.21 CM
LA MINOR: 2.28 CM
LEFT ATRIUM SIZE: 3 CM
LEFT INTERNAL DIMENSION IN SYSTOLE: 2.78 CM (ref 2.1–4)
LEFT VENTRICLE DIASTOLIC VOLUME INDEX: 36.93 ML/M2
LEFT VENTRICLE DIASTOLIC VOLUME: 72.38 ML
LEFT VENTRICLE MASS INDEX: 52 G/M2
LEFT VENTRICLE SYSTOLIC VOLUME INDEX: 14.9 ML/M2
LEFT VENTRICLE SYSTOLIC VOLUME: 29.14 ML
LEFT VENTRICULAR INTERNAL DIMENSION IN DIASTOLE: 4.06 CM (ref 3.5–6)
LEFT VENTRICULAR MASS: 102.27 G
LV LATERAL E/E' RATIO: 7.89 M/S
LV SEPTAL E/E' RATIO: 7.89 M/S
LVOT MG: 1.44 MMHG
LVOT MV: 0.56 CM/S
MV PEAK A VEL: 0.75 M/S
MV PEAK E VEL: 0.71 M/S
MV STENOSIS PRESSURE HALF TIME: 60 MS
MV VALVE AREA P 1/2 METHOD: 3.67 CM2
PISA MRMAX VEL: 1.94 M/S
PISA TR MAX VEL: 2.36 M/S
PV MV: 0.68 M/S
PV PEAK GRADIENT: 4 MMHG
PV PEAK VELOCITY: 0.95 M/S
RA MAJOR: 3.51 CM
RA PRESSURE ESTIMATED: 3 MMHG
RA WIDTH: 1.82 CM
RIGHT VENTRICULAR END-DIASTOLIC DIMENSION: 2.48 CM
RIGHT VENTRICULAR LENGTH IN DIASTOLE (APICAL 4-CHAMBER VIEW): 4.44 CM
RV MID DIAMA: 1.56 CM
RV TB RVSP: 5 MMHG
SINUS: 2.69 CM
STJ: 2.77 CM
TDI LATERAL: 0.09 M/S
TDI SEPTAL: 0.09 M/S
TDI: 0.09 M/S
TR MAX PG: 22 MMHG
TRICUSPID ANNULAR PLANE SYSTOLIC EXCURSION: 1.6 CM
TV REST PULMONARY ARTERY PRESSURE: 25 MMHG
Z-SCORE OF LEFT VENTRICULAR DIMENSION IN END DIASTOLE: -3.23
Z-SCORE OF LEFT VENTRICULAR DIMENSION IN END SYSTOLE: -1.71

## 2023-10-03 PROCEDURE — 93306 TTE W/DOPPLER COMPLETE: CPT

## 2023-10-03 PROCEDURE — 93270 REMOTE 30 DAY ECG REV/REPORT: CPT

## 2023-10-03 PROCEDURE — 93306 ECHO (CUPID ONLY): ICD-10-PCS | Mod: 26,,, | Performed by: INTERNAL MEDICINE

## 2023-10-03 PROCEDURE — 3080F PR MOST RECENT DIASTOLIC BLOOD PRESSURE >= 90 MM HG: ICD-10-PCS | Mod: S$GLB,,, | Performed by: INTERNAL MEDICINE

## 2023-10-03 PROCEDURE — 99215 OFFICE O/P EST HI 40 MIN: CPT | Mod: S$GLB,,, | Performed by: INTERNAL MEDICINE

## 2023-10-03 PROCEDURE — 99215 PR OFFICE/OUTPT VISIT, EST, LEVL V, 40-54 MIN: ICD-10-PCS | Mod: S$GLB,,, | Performed by: INTERNAL MEDICINE

## 2023-10-03 PROCEDURE — 3080F DIAST BP >= 90 MM HG: CPT | Mod: S$GLB,,, | Performed by: INTERNAL MEDICINE

## 2023-10-03 PROCEDURE — 99999 PR PBB SHADOW E&M-EST. PATIENT-LVL V: ICD-10-PCS | Mod: PBBFAC,,, | Performed by: INTERNAL MEDICINE

## 2023-10-03 PROCEDURE — 93272 ECG/REVIEW INTERPRET ONLY: CPT | Mod: ,,, | Performed by: INTERNAL MEDICINE

## 2023-10-03 PROCEDURE — 3008F PR BODY MASS INDEX (BMI) DOCUMENTED: ICD-10-PCS | Mod: S$GLB,,, | Performed by: INTERNAL MEDICINE

## 2023-10-03 PROCEDURE — 3077F PR MOST RECENT SYSTOLIC BLOOD PRESSURE >= 140 MM HG: ICD-10-PCS | Mod: S$GLB,,, | Performed by: INTERNAL MEDICINE

## 2023-10-03 PROCEDURE — 93272 CARDIAC EVENT MONITOR (CUPID ONLY): ICD-10-PCS | Mod: ,,, | Performed by: INTERNAL MEDICINE

## 2023-10-03 PROCEDURE — 93306 TTE W/DOPPLER COMPLETE: CPT | Mod: 26,,, | Performed by: INTERNAL MEDICINE

## 2023-10-03 PROCEDURE — 99999 PR PBB SHADOW E&M-EST. PATIENT-LVL V: CPT | Mod: PBBFAC,,, | Performed by: INTERNAL MEDICINE

## 2023-10-03 PROCEDURE — 3008F BODY MASS INDEX DOCD: CPT | Mod: S$GLB,,, | Performed by: INTERNAL MEDICINE

## 2023-10-03 PROCEDURE — 3077F SYST BP >= 140 MM HG: CPT | Mod: S$GLB,,, | Performed by: INTERNAL MEDICINE

## 2023-10-03 PROCEDURE — 93271 ECG/MONITORING AND ANALYSIS: CPT

## 2023-10-03 RX ORDER — DEXAMETHASONE 4 MG/1
8 TABLET ORAL 2 TIMES DAILY
Qty: 24 TABLET | Refills: 1 | Status: SHIPPED | OUTPATIENT
Start: 2023-10-03

## 2023-10-09 ENCOUNTER — PATIENT MESSAGE (OUTPATIENT)
Dept: HEMATOLOGY/ONCOLOGY | Facility: CLINIC | Age: 58
End: 2023-10-09
Payer: COMMERCIAL

## 2023-10-09 NOTE — TELEPHONE ENCOUNTER
Spoke with the patient and instructed on the importance of maintaining [employer] appt, pt verbalized understanding and will keep appointment. Patient also stated that she wants to have her first infusion at [facility] with Dr. Guerrero. Dr, Khoobehi notified.

## 2023-10-10 ENCOUNTER — TELEPHONE (OUTPATIENT)
Dept: INFUSION THERAPY | Facility: HOSPITAL | Age: 58
End: 2023-10-10
Payer: COMMERCIAL

## 2023-10-12 ENCOUNTER — OFFICE VISIT (OUTPATIENT)
Dept: HEMATOLOGY/ONCOLOGY | Facility: CLINIC | Age: 58
End: 2023-10-12
Payer: COMMERCIAL

## 2023-10-12 VITALS
WEIGHT: 201.06 LBS | HEART RATE: 65 BPM | TEMPERATURE: 98 F | HEIGHT: 64 IN | RESPIRATION RATE: 18 BRPM | BODY MASS INDEX: 34.33 KG/M2 | DIASTOLIC BLOOD PRESSURE: 77 MMHG | SYSTOLIC BLOOD PRESSURE: 151 MMHG | OXYGEN SATURATION: 98 %

## 2023-10-12 DIAGNOSIS — C50.411 MALIGNANT NEOPLASM OF UPPER-OUTER QUADRANT OF RIGHT BREAST IN FEMALE, ESTROGEN RECEPTOR POSITIVE: Primary | ICD-10-CM

## 2023-10-12 DIAGNOSIS — R06.02 SOB (SHORTNESS OF BREATH): ICD-10-CM

## 2023-10-12 DIAGNOSIS — Z17.0 MALIGNANT NEOPLASM OF UPPER-OUTER QUADRANT OF RIGHT BREAST IN FEMALE, ESTROGEN RECEPTOR POSITIVE: Primary | ICD-10-CM

## 2023-10-12 DIAGNOSIS — C78.01 SECONDARY MALIGNANT NEOPLASM OF HILUS OF RIGHT LUNG: ICD-10-CM

## 2023-10-12 PROCEDURE — 3008F BODY MASS INDEX DOCD: CPT | Mod: S$GLB,,, | Performed by: NURSE PRACTITIONER

## 2023-10-12 PROCEDURE — 3077F PR MOST RECENT SYSTOLIC BLOOD PRESSURE >= 140 MM HG: ICD-10-PCS | Mod: S$GLB,,, | Performed by: NURSE PRACTITIONER

## 2023-10-12 PROCEDURE — 99215 OFFICE O/P EST HI 40 MIN: CPT | Mod: S$GLB,,, | Performed by: NURSE PRACTITIONER

## 2023-10-12 PROCEDURE — 3078F DIAST BP <80 MM HG: CPT | Mod: S$GLB,,, | Performed by: NURSE PRACTITIONER

## 2023-10-12 PROCEDURE — 99999 PR PBB SHADOW E&M-EST. PATIENT-LVL V: ICD-10-PCS | Mod: PBBFAC,,, | Performed by: NURSE PRACTITIONER

## 2023-10-12 PROCEDURE — 3077F SYST BP >= 140 MM HG: CPT | Mod: S$GLB,,, | Performed by: NURSE PRACTITIONER

## 2023-10-12 PROCEDURE — 3078F PR MOST RECENT DIASTOLIC BLOOD PRESSURE < 80 MM HG: ICD-10-PCS | Mod: S$GLB,,, | Performed by: NURSE PRACTITIONER

## 2023-10-12 PROCEDURE — 1160F RVW MEDS BY RX/DR IN RCRD: CPT | Mod: S$GLB,,, | Performed by: NURSE PRACTITIONER

## 2023-10-12 PROCEDURE — 99999 PR PBB SHADOW E&M-EST. PATIENT-LVL V: CPT | Mod: PBBFAC,,, | Performed by: NURSE PRACTITIONER

## 2023-10-12 PROCEDURE — 3008F PR BODY MASS INDEX (BMI) DOCUMENTED: ICD-10-PCS | Mod: S$GLB,,, | Performed by: NURSE PRACTITIONER

## 2023-10-12 PROCEDURE — 1160F PR REVIEW ALL MEDS BY PRESCRIBER/CLIN PHARMACIST DOCUMENTED: ICD-10-PCS | Mod: S$GLB,,, | Performed by: NURSE PRACTITIONER

## 2023-10-12 PROCEDURE — 1159F PR MEDICATION LIST DOCUMENTED IN MEDICAL RECORD: ICD-10-PCS | Mod: S$GLB,,, | Performed by: NURSE PRACTITIONER

## 2023-10-12 PROCEDURE — 99215 PR OFFICE/OUTPT VISIT, EST, LEVL V, 40-54 MIN: ICD-10-PCS | Mod: S$GLB,,, | Performed by: NURSE PRACTITIONER

## 2023-10-12 PROCEDURE — 1159F MED LIST DOCD IN RCRD: CPT | Mod: S$GLB,,, | Performed by: NURSE PRACTITIONER

## 2023-10-12 RX ORDER — PROCHLORPERAZINE MALEATE 10 MG
10 TABLET ORAL EVERY 6 HOURS PRN
Qty: 30 TABLET | Refills: 1 | Status: SHIPPED | OUTPATIENT
Start: 2023-10-12 | End: 2024-10-11

## 2023-10-12 NOTE — PROGRESS NOTES
FOLLOW-UP APPOINTMENT    PATIENT:   Sasha Hensley  :    1965  MR#:    [id number]  DATE OF VISIT:  10/12/2023      Chief Complaint:  Metastatic breast cancer    HPI:   Ms. Sasha Hensley presents today for chemotherapy education.  She will be starting treatment with THP for the above diagnosis.       Review of Systems   Constitutional: Negative.    HENT:  Negative.     Eyes: Negative.    Respiratory:  Positive for shortness of breath.    Cardiovascular: Negative.    Gastrointestinal: Negative.    Endocrine: Negative.    Genitourinary: Negative.     Musculoskeletal: Negative.    Skin: Negative.    Neurological: Negative.    Hematological: Negative.    Psychiatric/Behavioral: Negative.       Oncology History   Malignant neoplasm of upper-outer quadrant of right breast in female, estrogen receptor positive   2023 Initial Diagnosis    Malignant neoplasm of upper-outer quadrant of right breast in female, estrogen receptor positive     10/16/2023 -  Chemotherapy    Treatment Summary   Plan Name: OP BREAST DOCETAXEL TRASTUZUMAB PERTUZUMAB (THP) Q3W  Treatment Goal: Palliative  Status: Active  Start Date: 10/16/2023 (Planned)  End Date: 2024 (Planned)  Provider: Cristine Guerrero MD  Chemotherapy: DOCEtaxel (TAXOTERE) 75 mg/m2 = 152 mg in sodium chloride 0.9% 257.6 mL chemo infusion, 75 mg/m2, Intravenous, Clinic/HOD 1 time, 0 of 6 cycles  pertuzumab (PERJETA) 840 mg in sodium chloride 0.9% 278 mL infusion, 840 mg, Intravenous, Clinic/HOD 1 time, 0 of 17 cycles  trastuzumab-anns (KANJINTI) 737 mg in sodium chloride 0.9% 250 mL chemo infusion, 8 mg/kg, Intravenous, Clinic/HOD 1 time, 0 of 17 cycles     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   2023 Initial Diagnosis    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     10/16/2023 -  Chemotherapy    Treatment Summary   Plan Name: OP BREAST DOCETAXEL TRASTUZUMAB PERTUZUMAB (THP) Q3W  Treatment Goal: Palliative  Status: Active  Start Date:  10/16/2023 (Planned)  End Date: 9/16/2024 (Planned)  Provider: Cristine Guerrero MD  Chemotherapy: DOCEtaxel (TAXOTERE) 75 mg/m2 = 152 mg in sodium chloride 0.9% 257.6 mL chemo infusion, 75 mg/m2, Intravenous, Clinic/HOD 1 time, 0 of 6 cycles  pertuzumab (PERJETA) 840 mg in sodium chloride 0.9% 278 mL infusion, 840 mg, Intravenous, Clinic/HOD 1 time, 0 of 17 cycles  trastuzumab-anns (KANJINTI) 737 mg in sodium chloride 0.9% 250 mL chemo infusion, 8 mg/kg, Intravenous, Clinic/HOD 1 time, 0 of 17 cycles         Patient Active Problem List   Diagnosis    Multiple sclerosis    IBS (irritable bowel syndrome)    Atrial fibrillation, chronic    Dyspnea on exertion    Abdominal swelling, generalized    MICHELLE (generalized anxiety disorder)    Generalized abdominal pain    Iron deficiency anemia    Scoliosis    Sciatica    Osteoporosis    Neoplasm of adipose tissue    Mouth ulcers    Chronic migraine    Cerebrovascular small vessel disease    Breast cancer    Bladder prolapse, female, acquired    Asthma    Arthritis    Aphasia due to closed TBI (traumatic brain injury)    Anxiety    Dysfunctional gallbladder    Bowel dysfunction    Severe recurrent major depression without psychotic features    HTN (hypertension), dx 2018    Essential hypertension    Chronic migraine w/o aura w/o status migrainosus, not intractable    Seasonal allergies    Lymphedema syndrome, postmastectomy    Foot pain, right    Plantar fasciitis    Peroneal tendonitis, unspecified laterality    Onychomycosis due to dermatophyte    Neuritis    Malignant neoplasm of upper-outer quadrant of right breast in female, estrogen receptor positive    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     Precordial chest pain    Class 2 severe obesity due to excess calories with serious comorbidity and body mass index (BMI) of 35.0 to 35.9 in adult    Cardiovascular event risk, ASCVD 10-year risk 5%, 2016    History of TIA (transient ischemic attack)    Anticoagulant medication declined by patient       Past Medical History:   Diagnosis Date    Anxiety     Aphasia due to closed TBI (traumatic brain injury)     Arthritis     Asthma     Bladder prolapse, female, acquired     Breast cancer     Cerebrovascular small vessel disease     HTN (hypertension)     IBS (irritable bowel syndrome)     Migraines     Mouth ulcers     Multiple sclerosis     Neoplasm of adipose tissue     Osteoporosis     Sciatica     Scoliosis        Past Surgical History:   Procedure Laterality Date    BILATERAL MASTECTOMY      TOTAL ABDOMINAL HYSTERECTOMY W/ BILATERAL SALPINGOOPHORECTOMY      partial        Social History     Socioeconomic History    Marital status: Single   Tobacco Use    Smoking status: Never    Smokeless tobacco: Never   Substance and Sexual Activity    Alcohol use: Not Currently    Drug use: Never    Sexual activity: Not Currently       Family History   Problem Relation Age of Onset    Multiple sclerosis Mother     Parkinsonism Mother     Multiple sclerosis Maternal Grandmother     Multiple sclerosis Maternal Aunt     Parkinsonism Father     Heart disease Father     Heart disease Son          Current Outpatient Medications:     albuterol (PROVENTIL) 2.5 mg /3 mL (0.083 %) nebulizer solution, Take 2.5 mg by nebulization every 6 (six) hours as needed., Disp: , Rfl:     alfalfa 250 mg Tab, Take 433 mg by mouth once daily., Disp: , Rfl:     ALPRAZolam (XANAX) 1 MG tablet, Take 1 mg by mouth., Disp: , Rfl:     amLODIPine (NORVASC) 5 MG tablet, Take 5 mg by mouth., Disp: , Rfl:     arginine 500 mg tablet, Take 500 mg by mouth once daily., Disp: , Rfl:     azelastine (ASTELIN) 137 mcg (0.1 %) nasal spray, 2 sprays (274 mcg total)  by Nasal route 2 (two) times daily. (Patient not taking: Reported on 6/23/2022), Disp: 60 mL, Rfl: 3    carvediloL (COREG) 6.25 MG tablet, Take 1 tablet (6.25 mg total) by mouth 2 (two) times daily., Disp: 60 tablet, Rfl: 2    cholecalciferol, vitamin D3, 10 mcg (400 unit) Cap capsule, Take 400 Units by mouth once daily., Disp: , Rfl:     cyanocobalamin 500 MCG tablet, Take 500 mcg by mouth once daily., Disp: , Rfl:     dexAMETHasone (DECADRON) 4 MG Tab, Take 2 tablets (8 mg total) by mouth 2 (two) times a day. Take 2 tablets (8 mg) by mouth twice daily on the day before chemotherapy and the day after chemotherapy (will receive IV dexamethasone on day of chemotherapy)., Disp: 24 tablet, Rfl: 1    diclofenac sodium (VOLTAREN) 1 % Gel, Apply 2 g topically 4 (four) times daily., Disp: 200 g, Rfl: 2    ELIQUIS 5 mg Tab, Take 5 mg by mouth 2 (two) times daily., Disp: , Rfl:     flaxseed oil 1,000 mg Cap, Take 1 capsule by mouth once daily., Disp: , Rfl:     garlic 1,000 mg Cap, Take 1 tablet by mouth once daily., Disp: , Rfl:     ginkgo biloba 60 mg Cap, Take 1 tablet by mouth once daily., Disp: , Rfl:     GUM XSCUMQ-QQFNYC-CBTU-ALCOHOL MISC, Take 500 mg by mouth once daily., Disp: , Rfl:     HONEY ORAL, Take 1 spray by mouth once daily., Disp: , Rfl:     ivermectin (STROMECTOL) 3 mg Tab, Take 60 mg by mouth once., Disp: , Rfl:     L.acid,gas,plan,rhm/B.ani/cran (UP4 PROBIOTICS WOMEN'S ORAL), Take 1 tablet by mouth once daily., Disp: , Rfl:     letrozole (FEMARA) 2.5 mg Tab, Take 1 tablet (2.5 mg total) by mouth once daily., Disp: 30 tablet, Rfl: 2    LICORICE ROOT EXTRACT, BULK, MISC, Take 1 tablet by mouth once daily., Disp: , Rfl:     magnesium 200 mg Tab, Take 1 tablet by mouth once daily., Disp: , Rfl:     MAGNESIUM ORAL, Take 500 mg by mouth once a week., Disp: , Rfl:     metoclopramide HCl (REGLAN) 5 MG tablet, Take 1 tablet (5 mg total) by mouth 3 (three) times daily before meals., Disp: 30 tablet, Rfl: 0     multivit-min/folic acid/biotin (HAIR,SKIN AND NAILS,FA-BIOTIN, ORAL), Take 3,000 mcg by mouth once daily., Disp: , Rfl:     mv-min-folic acid-lutein (CENTRUM SILVER) 400-250 mcg Chew, Take 1 tablet by mouth once daily., Disp: , Rfl:     mv-mn-vitC-dhdUj-Tll-Vdf-hc124 (AIRBORNE, ASCORBATE SODIUM,) 333-1.7 mg Chew, Take 1 tablet by mouth once daily., Disp: , Rfl:     niacin 500 MG CpSR, Take 250 mg by mouth every evening., Disp: , Rfl:     omega-3 fatty acids/fish oil (FISH OIL-OMEGA-3 FATTY ACIDS) 300-1,000 mg capsule, Take 1 capsule by mouth once daily., Disp: , Rfl:     omeprazole (PRILOSEC) 40 MG capsule, Take 1 capsule (40 mg total) by mouth 2 (two) times daily before meals., Disp: 180 capsule, Rfl: 3    ondansetron (ZOFRAN-ODT) 4 MG TbDL, Take 4 mg by mouth once daily., Disp: , Rfl:     OZEMPIC 0.25 mg or 0.5 mg (2 mg/3 mL) pen injector, START WITH INJECTING 0.25 MG FOR 2 WEEKS THEN INCREASE TO 0.5MG WEEKLY, Disp: , Rfl:     Panax, American ginsg/B12/royl (GINSENG COMPLEX ORAL), Take 1 tablet by mouth once daily., Disp: , Rfl:     potassium 99 mg Tab, Take 1 tablet by mouth once daily., Disp: , Rfl:     pyridoxine, vitamin B6, (B-6) 100 MG Tab, Take 50 mg by mouth once daily., Disp: , Rfl:     rizatriptan (MAXALT) 10 MG tablet, Take by mouth daily as needed., Disp: , Rfl:     royal jelly-bee pollen-ginseng -250 mg Cap, Take 1 tablet by mouth once daily., Disp: , Rfl:     sodium chloride for inhalation (SODIUM CHLORIDE 0.9%) 0.9 % nebulizer solution, SMARTSI Vial(s) Via Nebulizer PRN, Disp: , Rfl:     soy isofla/blk cohosh/mag bark (ESTROVEN ORAL), Take 1 tablet by mouth once daily., Disp: , Rfl:     soy-blk cohosh-green tea-yerba (ESTROVEN ENERGY) 56- mg Tab, Take 1 tablet by mouth once daily., Disp: , Rfl:     valACYclovir (VALTREX) 1000 MG tablet, Take 1 tablet (1,000 mg total) by mouth 3 (three) times daily. for 10 days, Disp: 30 tablet, Rfl: 3    VALERIAN ROOT ORAL, Take 880 mg by mouth once  daily., Disp: , Rfl:     vitamin E 400 UNIT capsule, Take 400 Units by mouth once daily., Disp: , Rfl:     zinc gluconate 50 mg tablet, Take 50 mg by mouth once daily., Disp: , Rfl:     Review of patient's allergies indicates:   Allergen Reactions    Nsaids (non-steroidal anti-inflammatory drug) Swelling     Other reaction(s): Angio-oedema    Peanut Anaphylaxis    Sumatriptan Shortness Of Breath    Venom-wasp Anaphylaxis    Blue-emu lidocaine patch [lidocaine] Nausea And Vomiting    Ipratropium analogues Other (See Comments) and Photosensitivity    Perfume Other (See Comments)     Migraines     Terbinafine hcl     Wasp venom      Mud wasp per pt.     Latex Rash       Physcial Examination  VITAL SIGNS:    There is no height or weight on file to calculate BSA.   Pain Assessment: No pain   There were no vitals filed for this visit.       Wt Readings from Last 5 Encounters:   10/03/23 90.7 kg (200 lb)   10/03/23 90.7 kg (200 lb)   10/02/23 91.2 kg (201 lb 1.6 oz)   09/08/23 92.1 kg (203 lb 0.7 oz)   08/30/23 91.2 kg (201 lb)       GENERAL:  Sasha Hensley is healthy-appearing 58 y.o. female, in no distress.   EYES:   Pupils equal, round, reactive.  Conjunctivae, sclera and lids normal.  HEENT: Head normocephalic and atraumatic, without alopecia.  Oropharynx is unremarkable.  No icterus, jaundice, stomatitis, mucositis, or ulceration is noted.  Ears are clear and unremarkable.  Nose, nares, and septum are unremarkable.    NECK:   No masses.  Thyroid and trachea are normal.    BREASTS:  Deferred.  RESPIRATORY: Clear to auscultation bilaterally.  Symmetrically effortless expansion.  No wheezing and no stridor.    CV: Heart reveals regular rate and rhythm without murmur, rub, or gallops.  ABDOMEN: Soft, non-tender.  No masses, no hernias, and no rebound or rigidity are noted.  /RECTAL:  Deferred.  LYMPHATICS: No preauricular, submandibular, cervical, supraclavicular, axillary, lymphadenopathy.  MUSCULOSKELETAL:Fair  musculature, no atrophy.  No arthritic changes.  No edema or cyanosis. Back is without gross abnormal curvature.   NEUROLOGICAL: Cranial nerves II-XII grossly intact.  Motor and sensory exam intact.  SKIN:   No lesions, bruises, petechiae or rashes.  Good turgor.    PSYCHIATRIC: Patient is alert and oriented to time, place and person.  Mood and affect are appropriate.         Laboratory and Radiology   Lab Results   Component Value Date    WBC 8.6 06/12/2020    RBC 4.53 06/12/2020    HGB 13.2 06/12/2020    HCT 39.5 06/12/2020    MCV 87.2 06/12/2020    MCH 29.1 06/12/2020    MCHC 33.4 06/12/2020    RDW 43.4 06/12/2020     06/12/2020    MPV 9.4 06/12/2020    MONO 6.40 06/12/2020    MONO 0.55 06/12/2020    BASO 0.04 06/12/2020    EOSINOPHIL 0.50 (L) 06/12/2020    EOSINOPHIL 0.04 (L) 06/12/2020    BASOPHIL 0.50 06/12/2020     BMP  Lab Results   Component Value Date     09/30/2021    K 4.1 09/30/2021     09/30/2021    CO2 26 09/30/2021    BUN 8 (L) 09/30/2021    CREATININE 0.67 09/30/2021    CALCIUM 10.2 09/30/2021    ESTGFRAFRICA >90 09/30/2021    EGFRNONAA >90 09/30/2021     Lab Results   Component Value Date    ALT 19 02/12/2020    AST 26 02/12/2020    ALKPHOS 64 02/12/2020     No results found for this or any previous visit (from the past 2160 hour(s)).  No results found for this or any previous visit (from the past 2160 hour(s)).  No results found for this or any previous visit (from the past 2160 hour(s)).    Pathology  Pathology Results  (Last 10 years)      None            TITLE: PLAN OF CARE FOR THE CHEMOTHERAPY PATIENT / TEACHING PROTOCOL    PURPOSE: To involve the patient / significant other in the plan of care and to provide teaching to the significant other & patient receiving chemotherapy.    LEVEL: Independent.    CONTENT: The Plan of Care for the chemotherapy patient is individualized and appropriate to the patients needs, strengths, limitations, & goals.  Education includes  information regarding chemotherapy side effects, the treatment itself, and self-care  Activities.    GOAL / OUTCOME STANDARDS    PHYSIOLOGIC: The client will remain free or experience minimal side effects or toxicities throughout the chemotherapy treatment period.     PSYCHOLOGIC: The client/significant others will demonstrate positive coping mechanisms in relation to chemotherapy and its side effects.      COGINITIVE: The client/significant others will verbalize understanding of self-care measure to avoid/minimize side effects of the chemotherapy regime.    EVALUATION / COMMENT KEY:    V = Audiovisual/Video  S = Successfully meets outcome  N = Needs further instruction  NA = Not applicable to the patient  P = Previous knowledge  U = Unable to comprehend  * = See progress notes          PLAN OF CARE  INFORMATION TO BE DELIVERED / NURSING INTERVENTIONS DATE EVALUATION   Assessment of client/caregiver,         knowledge of cancer diagnosis,         and chemotherapy as a treatment. 1a. Evaluate patient/caregiver learning ability    b. Plan teaching sessions with patient/caregiver according to needs and present anxiety level/ability to learn.    c. Provide Chemotherapy Education Packet,        Mouth Care Protocol,         Specific Patient Education Sheets. 10/12/2023 S   Individual chemotherapy treatment         plan. 2a. Review of Chemotherapy Education handout from [employer]            10/12/2023   S   Knowledge Deficit & Self-Management of general side effects common to all chemotherapy:  Nausea/Vomiting  b.   Diarrhea  Mouth Care  Dental care  Constipation  Hair Loss  Potential for infection  Fatigue   3a. Reinforce that the majority of side effects from chemotherapy are reversible and are  controlled both in the hospital and at home        (blood counts recover, hair grows back).   b.  Refer to the following for reinforcement of         information post-treatment:  Mouth Care Protocol.  Bowel Protocol for  constipation or diarrhea.  3.  Drug Specific Chemotherapy Information Sheets for each medication patient receiving.    10/12/2023     S     PLAN OF CARE  INFORMATION TO BE DELIVERED / NURSING INTERVENTIONS DATE EVALUATION   h. Potential for bleeding         i. Potential anemia/fatigue         j. Potential sunburn         k. Birth control measures  l. Safety measures post treatment 4.  Chemotherapy Home Care Instruction  and Safety Information Sheet.  A. patient/caregivers to thoroughly cook shellfish (shrimp, crab, etc) to decrease the chance of infection.    B.  Use sunscreen and protective clothing while in the sun.   10/12/2023      Knowledge deficit & Self Management of Drug Specific  Side Effects.    BLADDER EFFECTS        (Hemorrhagic Cystitis)                  Preventable with adequate hydration; occurs 2-3 days or more post treatment.   1.  Instruct patient to:  a.   Void at least every 2 hours; increase intake.  b.   DO NOT hold urine; go when urge is felt.  c.    Empty bladder at bedtime and on         awakening.  d.   Observe for color changes (red to tea           colored), amount and frequency changes.  e.   Notify oncologist of any abnormalities           in urine or voiding or if you cannot               drink adequate fluids.   10/12/2023   S   b.   CHANGES IN URINE        COLOR:      1.   Instruct patient:  a.   Most evident in first 2-3 voidings after           administration.  Lasts less than 24 hours.  If urine is discolored 2 or more days post- treatment, notify oncologist.      10/12/2023 S   c.    KIDNEY EFFECTS           (Nephrotoxicity)   1.  Instruct patient to:  a.   Drink 8-16 glasses of fluid/day the day   pre-treatment and 3-4 days post-treatment to maintain hydration; the best way to minimize kidney problems.  b.   Notify oncologist immediately if unable to drink fluids or if changes are noted in urinary elimination.     10/12/2023   S   PULMONARY TOXICITY    Instruct patient to report  symptoms such as shortness of breath, chest pain, shallow breathing, or chest wall discomfort to physician.  Reinforce preventative measures used by the health care team.  Baseline and periodic PFT and chest x-ray.   10/12/2023   S     PLAN OF CARE INFORMATION TO BE DELIVERED / NURSING INTERVENTIONS DATE EVALUATION   NERVE & MUSCLE EFFECTS (neurotoxocity; neuropathy, possible visual/hearing changes)        Instruct patient to:    Report numbness or tingling of the hands/feet, loss of fine motor movement (buttoning shirt, tying shoelaces), or gait changes to your oncologist.  If numbness/tingling are present:  protect feet with shoes at all times.  Use gloves for washing dishes/gardening & potholders in kitchen.       10/12/2023   S   CARDIOTOXICITY  Decreased effectiveness of             cardiac function. Effective are                  cumulative and irreversible.                                    CARDIAC ARRYTHMIAS              4   Instruct:  Heart function may be tested before treatment and perdiocally during treatment.  Notify oncologist of irregular pulse, palpitations, shortness of breath, or swelling in lower extremities/feet.          Taxol and Taxotere can cause arrhythmias on infusion that resolve once infusion discontinued. Instruct nurse if any irregularity felt.    10/12/2023   S   EXTRAVASTION  Occurs when vesicants leak outside of vein and cause damage to the skin and underlying tissues.   Reinforce preventive measures used to avoid complications.  Fresh IV site or central line monitored continuously with vesicant IVP.  Continuous infusion via central line site and blood return monitored periodically around the clock.  Instruct to:  Notify nurse of any discomfort, burning, stinging, etc. at IV site during chemotherapy administration.  Notify oncologist of any redness, pain, or swelling at IV site after discharge from hospital.   10/12/2023   S   HYPERSENSITIVITY can happen with any medication.    Instruct patient:  Nurse is with them during the initial part of treatment and will be close by to monitor.  Pre-medication ordered by the oncologist must be taken on time. If doses are missed, treatment will need to be re-scheduled.  Skin redness, itching, or hives appearing after discharge should be reported to oncologist. 10/12/2023   S       PLAN OF CARE INFORMATION TO BE DELIVERED / NURSING INTERVENTIONS DATE EVALUATION   FLU-LIKE SYNDROME      Instruct patient symptoms are hard to prevent and may include fever, shaking chills, muscle and body aches.  Taking prescribed medications from physician if needed.  Adequate fluids are important.    Reinforce the need to call if temperature is         elevated to 100.4 or more  10/12/2023   S   HAND-FOOT SYNDROME  causes painful, symmetric swelling and redness of palms and soles                  Instruct patient to report any numbness or tingling in the hands or feet.  Explain prevention techniques, such as     Use heavy moisturizers to lessen skin dryness and itching, but to avoid if skin is cracked or broken  Bathe in tepid water, use non-perfumed soap, and wash gently. Baths with oatmeal or diluted baking soda may be soothing.  Avoid tight fitting shoes and repetitive actions, such as rubbing hands or applying pressure to hands/feet.  Review measures to take should syndrome occur:  Cold compresses and elevation for          edema  Pain medications and other measures as ordered by oncologist.   4.   Syndrome resolves few weeks after therapy. 10/12/2023   S   5. DISCHARGE PLANNING /        EDUCATION 1.    Explain importance of compliance with follow- up  tests (CBC, CMP).  2.    Verify patient/caregiver know:  a.    Oncologists office phone number.  b.    Dates of follow-up appointments.  c.    Prescriptions given for nausea  3.   Review side effects to monitor and notify          oncologist about.  4.   Reinforce the need for patient and caregivers to:  a.    Review  information given.  b.    Call oncologists office with questions          or symptoms  5.   Provide [facility] [place] [facility] make referrals if needed for financial or .   10/12/2023   S     PROGRESS NOTES: I met with the patient  today for chemotherapy education. she will be starting treatment with THP. We discussed the mechanism of action, potential side effects of this treatment as well as ways she can manage them at home. Some of these side effects include but or not limited to fever, nausea, vomiting, decreased appetite, fatigue, weakness, cytopenias, myalgia/arthralgia, constipation, diarrhea, bleeding, headache, shortness of breath, nail changes, taste change, hair thinning/loss, mood disturbances, or edema. We also discussed dietary modifications she should make although this will be discussed in more detail with the dietician. she was provided with anti-emetic medication, a copy of all of the information we discussed today as well as our contact information. she will be provided a schedule on his first day of treatment. We will obtain labs on a weekly basis and the patient will follow-up with the physician for toxicity monitoring throughout treatment. All questions were answered and an informed consent was obtained. she was reminded to certainly contact us sooner if needed.  Attached to the patients folder and discussed with the patient the 24 hour/ 7 days a week after hours telephone number for the physician.  Patient notified to call anytime 24/7 because their is a physician on call for any problems that may arise.  Patient also notified to report to [facility] if they can not get in touch with a physician after hours.  Discussed the five wishes booklet with the patient and their family.           Assessment/Plan   Metastatic breast cancer:   -see MD prior to cycle 1 for clearance  -referral to financial assistance  -refer to home health  -patient would like to be connected  with clergy      Medications Ordered:  Zofran 8mg 1 tab PO Q8h prn nausea  Compazine 10 mg PO Q4-6h prn nausea  Emla cream: Apply to port site 30min prior to treatment and cover with saran wrap  Claritin 10mg PO QD day of chemotherapy and for 5 days after Neulasta to help prevent bone pain  OTC NSAIDS  Take 2 PO QD day of and for 5 days after Neulasta to help prevent bone pain  OTC Imodium as directed  OTC MiraLax 17 g daily for constipation  Decadron 8 mg p.o. b.i.d. the day before chemotherapy and the day after    * please notify clinic or report to the emergency department for fever of 100.4 grade  * avoid tobacco and alcohol use  * maintain a healthy diet and good oral hydration  * good blood pressure and blood sugar control is important.  Please keep all followups with your primary care provider  *good hand hygiene  * please call clinic with any questions or concerns  * please take all medications as directed     Patient is in agreement with the proposed treatment plan. All questions were answered to the patient's satisfaction. Patient knows to call clinic for any new or worsening symptoms and if anything is needed before the next clinic visit.    Milvia DOWNING  Hematology & Medical Oncology     Collaborating physician, Dr. Khoobehi.    50 minutes of total time spent on the encounter, which includes face-to-face time and non face-to-face time preparing to see the patient (e.g., review of tests), obtaining and/or reviewing separately obtain history, documenting clinical information in the electronic or other health record, independently interpreting results (not separately reported) and communicating results to the patient/family/caregiver or care coordination (not separately reported).  Electronically signed by: Milvia DOWNING

## 2023-10-13 ENCOUNTER — PATIENT MESSAGE (OUTPATIENT)
Dept: HEMATOLOGY/ONCOLOGY | Facility: CLINIC | Age: 58
End: 2023-10-13
Payer: COMMERCIAL

## 2023-10-14 ENCOUNTER — HOSPITAL ENCOUNTER (OUTPATIENT)
Dept: RADIOLOGY | Facility: HOSPITAL | Age: 58
Discharge: HOME | End: 2023-10-14
Attending: NURSE PRACTITIONER
Payer: COMMERCIAL

## 2023-10-14 ENCOUNTER — PATIENT MESSAGE (OUTPATIENT)
Dept: HEMATOLOGY/ONCOLOGY | Facility: CLINIC | Age: 58
End: 2023-10-14
Payer: COMMERCIAL

## 2023-10-14 ENCOUNTER — NURSE TRIAGE (OUTPATIENT)
Dept: ADMINISTRATIVE | Facility: CLINIC | Age: 58
End: 2023-10-14
Payer: COMMERCIAL

## 2023-10-14 DIAGNOSIS — R06.02 SOB (SHORTNESS OF BREATH): ICD-10-CM

## 2023-10-14 NOTE — TELEPHONE ENCOUNTER
Had an appt today for a CT scan to see if tumor has spread. Supposed to get the CT without contrast and was told that it has to be with contrast. Numerous attempts made to contact Dr Turcios without success. Message for Dr. Turcios office, upon opening please contact the patient regarding her CT that was not done on Saturday morning. Thanks  Reason for Disposition   [1] Caller requesting NON-URGENT health information AND [2] PCP's office is the best resource    Protocols used: Information Only Call - No Triage-A-

## 2023-10-14 NOTE — PROGRESS NOTES
Secure chat sent to Milvia Turcios:    this pt came in for her scheduled CTA PE exam today. the pt refused contrast for the exam. pt stated she was told there would be NO contrast, dye, or any chemicals used for this exam. me & my co-worker explained to the pt that a CTA is done with contrast (the contrast is what makes it a CTA). the pt stated she has small veins & we would not be able to get the IV in w/o the ultrasound machine; we do have access to the ultrasound machine on the weekends via the ED nurses. the pt stated she was starting chemo on Monday @ [facility] & she was not happy that she will be starting chemo without having the CTA PE exam w/o contrast. unfortunately this pts exam was not done due to the pt NOT wanting contrast. we asked for the pt to reach out to you concerning this issue. I also told the pt I would send a msg to you in reference to the same. please reach out to this pt. we apologized to the pt for the inconvenience & the misunderstanding of the CTA exam that was ordered. TY

## 2023-10-16 ENCOUNTER — PATIENT MESSAGE (OUTPATIENT)
Dept: HEMATOLOGY/ONCOLOGY | Facility: CLINIC | Age: 58
End: 2023-10-16
Payer: COMMERCIAL

## 2023-10-16 ENCOUNTER — TELEPHONE (OUTPATIENT)
Dept: INFUSION THERAPY | Facility: HOSPITAL | Age: 58
End: 2023-10-16
Payer: COMMERCIAL

## 2023-10-16 ENCOUNTER — TELEPHONE (OUTPATIENT)
Dept: HEMATOLOGY/ONCOLOGY | Facility: CLINIC | Age: 58
End: 2023-10-16
Payer: COMMERCIAL

## 2023-10-16 DIAGNOSIS — C50.411 MALIGNANT NEOPLASM OF UPPER-OUTER QUADRANT OF RIGHT BREAST IN FEMALE, ESTROGEN RECEPTOR POSITIVE: ICD-10-CM

## 2023-10-16 DIAGNOSIS — Z17.0 MALIGNANT NEOPLASM OF UPPER-OUTER QUADRANT OF RIGHT BREAST IN FEMALE, ESTROGEN RECEPTOR POSITIVE: ICD-10-CM

## 2023-10-16 DIAGNOSIS — R06.02 SHORTNESS OF BREATH: Primary | ICD-10-CM

## 2023-10-16 NOTE — TELEPHONE ENCOUNTER
New CT order sent per MARINA Turcios's request. Patient notified.     ----- Message from Margaret Chu sent at 10/14/2023 10:49 AM CDT -----  Contact: Marquita Brito On call  Type:  Needs Medical Advice    Who Called: Kim with [facility] on Call  Symptoms (please be specific):  pt is at radiology dept to have ct scan and wanting to use contrast  but pt is not suppose to use contrast  Would the patient rather a call back or a response via [facility]? call  Best Call Back Number: [phone] (home)       Additional Information: please advise and thank you.

## 2023-10-18 ENCOUNTER — TELEPHONE (OUTPATIENT)
Dept: HEMATOLOGY/ONCOLOGY | Facility: CLINIC | Age: 58
End: 2023-10-18
Payer: COMMERCIAL

## 2023-10-19 ENCOUNTER — TELEPHONE (OUTPATIENT)
Dept: HEMATOLOGY/ONCOLOGY | Facility: CLINIC | Age: 58
End: 2023-10-19
Payer: COMMERCIAL

## 2023-10-19 ENCOUNTER — PATIENT MESSAGE (OUTPATIENT)
Dept: HEMATOLOGY/ONCOLOGY | Facility: CLINIC | Age: 58
End: 2023-10-19
Payer: COMMERCIAL

## 2023-10-19 NOTE — NURSING
DANTEM for patient offering another appt opportunity with Dr. Meade for 10/20/23 at 10am, return contact information provided. Portal message sent to patient, EUNICE Phillips.

## 2023-10-20 ENCOUNTER — TELEPHONE (OUTPATIENT)
Dept: HEMATOLOGY/ONCOLOGY | Facility: CLINIC | Age: 58
End: 2023-10-20
Payer: COMMERCIAL

## 2023-10-20 ENCOUNTER — PATIENT MESSAGE (OUTPATIENT)
Dept: HEMATOLOGY/ONCOLOGY | Facility: CLINIC | Age: 58
End: 2023-10-20
Payer: COMMERCIAL

## 2023-10-20 NOTE — TELEPHONE ENCOUNTER
Spoke with patient and advised her that her appointments for 10/23/23 were canceled due to her not receiving her first chemotherapy infusion on 10/16/23 as scheduled and that the appointment was a toxicity check/follow-up appointment, pt verbalized understanding. Patient stated that she is feeling better and that she wants to get her first infusion at [facility] with Dr. Guerrero, I advised her to call Dr. Guerrero's office to reschedule her appt and infusion, she agreed.

## 2023-10-30 ENCOUNTER — TELEPHONE (OUTPATIENT)
Dept: HEMATOLOGY/ONCOLOGY | Facility: CLINIC | Age: 58
End: 2023-10-30
Payer: COMMERCIAL

## 2023-10-30 NOTE — NURSING
Desires to review supplements, anxiety and adjustment disorder with Dr. Meade, becomes very anxious when discussing cancer treament, using natural healing therapies currently, dietary modifications predominantly. Appt confirmed virtually for 11/3/23 at 1pm, EUNICE Phillips.

## 2023-11-01 ENCOUNTER — TELEPHONE (OUTPATIENT)
Dept: HEMATOLOGY/ONCOLOGY | Facility: CLINIC | Age: 58
End: 2023-11-01
Payer: COMMERCIAL

## 2023-11-01 NOTE — TELEPHONE ENCOUNTER
Left pt. a voicemail in regards to confirming upcoming appt. on Friday, November 3rd with Dr. Meade. MA instructed pt. in voicemail to call the office number to confirm.     BLD, MA Ext. [phone]

## 2023-11-03 ENCOUNTER — OFFICE VISIT (OUTPATIENT)
Dept: HEMATOLOGY/ONCOLOGY | Facility: CLINIC | Age: 58
End: 2023-11-03
Payer: COMMERCIAL

## 2023-11-03 DIAGNOSIS — I48.20 ATRIAL FIBRILLATION, CHRONIC: ICD-10-CM

## 2023-11-03 DIAGNOSIS — Z78.9 MEDICATION INTOLERANCE: ICD-10-CM

## 2023-11-03 DIAGNOSIS — C78.01 SECONDARY MALIGNANT NEOPLASM OF HILUS OF RIGHT LUNG: ICD-10-CM

## 2023-11-03 DIAGNOSIS — C50.411 MALIGNANT NEOPLASM OF UPPER-OUTER QUADRANT OF RIGHT BREAST IN FEMALE, ESTROGEN RECEPTOR POSITIVE: Primary | ICD-10-CM

## 2023-11-03 DIAGNOSIS — G35 MULTIPLE SCLEROSIS: ICD-10-CM

## 2023-11-03 DIAGNOSIS — Z17.0 MALIGNANT NEOPLASM OF UPPER-OUTER QUADRANT OF RIGHT BREAST IN FEMALE, ESTROGEN RECEPTOR POSITIVE: Primary | ICD-10-CM

## 2023-11-03 PROCEDURE — 99204 OFFICE O/P NEW MOD 45 MIN: CPT | Mod: 95,,, | Performed by: OBSTETRICS & GYNECOLOGY

## 2023-11-03 PROCEDURE — 99204 PR OFFICE/OUTPT VISIT, NEW, LEVL IV, 45-59 MIN: ICD-10-PCS | Mod: 95,,, | Performed by: OBSTETRICS & GYNECOLOGY

## 2023-11-03 NOTE — NURSING
.RN Odilon conducted chart review for clinic preparations including intake, barriers to care and opportunities for greater evaluation/recommendations by provider, Alan Leyva RN.

## 2023-11-03 NOTE — PROGRESS NOTES
The patient location is: home  The chief complaint leading to consultation is:discuss supplements      Visit type: Audiovisual     Face to Face time with patient:    minutes of total time spent on the encounter, which includes face to face time and non-face to face time preparing to see the patient (eg, review of tests), Obtaining and/or reviewing separately obtained history, Documenting clinical information in the electronic or other health record, Independently interpreting results (not separately reported) and communicating results to the patient/family/caregiver, or Care coordination (not separately reported).       Each patient to whom he or she provides medical services by telemedicine is:  (1) informed of the relationship between the physician and patient and the respective role of any other health care provider with respect to management of the patient; and (2) notified that he or she may decline to receive medical services by telemedicine and may withdraw from such care at any time.     Integrative Health and Medicine Initial Visit      Patient is a 57 yo postmenopausal female referred to Integrative Oncology for evaluation and recommendations pertaining to her recurrent metastatic breast cancer. She has been referred by Dr. Guerrero in hopes of integrating healing therapies that will compliment her upcoming chemotherapy regimen. Patient was initially diagnosed in [place] in March of 2018 with a ER+ [place]+ HER2(-) Right IDC. She underwent bilateral mastectomy 4/19/18, but declined adjuvant therapy including chemo, ovarian suppression and endocrine therapy. In July of 2023, she elected reconstruction where pulmonary nodules were evident upon her workup. Chemotherapy using THP has been recommended.       Patient remarks great fear given her prior cardiac condition as she worries chemotherapy will render her incapacitated given cardiotoxicity and underlying risks for myelosuppression. She is using a natural  and healing approach to delay progression of her disease using a liquid green diet and vitamin supplementation.  She  has been juicing daily for the last 2 months. She uses a - she has not solid food in 2 months. She eats salmon twice weekly.  She also has Multiple sclerosis and is managing this with diet as well. She quit taking her injections  about 3 years ago. She has atrial fibrillation and is no longer on anti coagulant  Her list of supplements include the following  Vitamin D3, beat juice, real mushroom supplement- likely turkey tail,cordyceps, reishi, lions liana, maitake and shitake, turmeric, fernando, bee pollen, [place] Gonda, cinnamon, zinc, potassium, magnesium, black pepper,,manuka honey, castor oil which she rubs on her body to shrink the lymph nodes, licorice root,lavender    She also takes Ivermectin    Cancer/Stage/TNM:   Cancer Staging   No matching staging information was found for the patient.     Oncology History   Malignant neoplasm of upper-outer quadrant of right breast in female, estrogen receptor positive   9/20/2023 Initial Diagnosis    Malignant neoplasm of upper-outer quadrant of right breast in female, estrogen receptor positive     10/16/2023 -  Chemotherapy    Treatment Summary   Plan Name: OP BREAST DOCETAXEL TRASTUZUMAB PERTUZUMAB (THP) Q3W  Treatment Goal: Palliative  Status: Active  Start Date: 10/16/2023 (Planned)  End Date: 9/16/2024 (Planned)  Provider: Cristine Guerrero MD  Chemotherapy: DOCEtaxel (TAXOTERE) 75 mg/m2 = 152 mg in sodium chloride 0.9% 257.6 mL chemo infusion, 75 mg/m2, Intravenous, Clinic/HOD 1 time, 0 of 6 cycles  pertuzumab (PERJETA) 840 mg in sodium chloride 0.9% 278 mL infusion, 840 mg, Intravenous, Clinic/HOD 1 time, 0 of 17 cycles  trastuzumab-anns (KANJINTI) 737 mg in sodium chloride 0.9% 250 mL chemo infusion, 8 mg/kg, Intravenous, Clinic/HOD 1 time, 0 of 17 cycles     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   9/20/2023 Initial  Diagnosis    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     10/16/2023 -  Chemotherapy    Treatment Summary   Plan Name: OP BREAST DOCETAXEL TRASTUZUMAB PERTUZUMAB (THP) Q3W  Treatment Goal: Palliative  Status: Active  Start Date: 10/16/2023 (Planned)  End Date: 9/16/2024 (Planned)  Provider: Cristine Guerrero MD  Chemotherapy: DOCEtaxel (TAXOTERE) 75 mg/m2 = 152 mg in sodium chloride 0.9% 257.6 mL chemo infusion, 75 mg/m2, Intravenous, Clinic/HOD 1 time, 0 of 6 cycles  pertuzumab (PERJETA) 840 mg in sodium chloride 0.9% 278 mL infusion, 840 mg, Intravenous, Clinic/HOD 1 time, 0 of 17 cycles  trastuzumab-anns (KANJINTI) 737 mg in sodium chloride 0.9% 250 mL chemo infusion, 8 mg/kg, Intravenous, Clinic/HOD 1 time, 0 of 17 cycles           Past Medical History:   Diagnosis Date    Anxiety     Aphasia due to closed TBI (traumatic brain injury)     Arthritis     Asthma     Bladder prolapse, female, acquired     Breast cancer     Cerebrovascular small vessel disease     HTN (hypertension)     IBS (irritable bowel syndrome)     Migraines     Mouth ulcers     Multiple sclerosis     Neoplasm of adipose tissue     Osteoporosis     Sciatica     Scoliosis         Current Outpatient Medications   Medication Instructions    albuterol (PROVENTIL) 2.5 mg, Nebulization, Every 6 hours PRN    alfalfa 433 mg, Oral, Daily    ALPRAZolam (XANAX) 1 mg, Oral    amLODIPine (NORVASC) 5 mg, Oral    arginine 500 mg, Oral, Daily    carvediloL (COREG) 6.25 mg, Oral, 2 times daily    cholecalciferol (vitamin D3) 400 Units, Oral, Daily    cyanocobalamin 500 mcg, Oral, Daily    dexAMETHasone (DECADRON) 8 mg, Oral, 2 times daily, Take 2 tablets (8 mg) by mouth twice daily on the day before chemotherapy and the day after chemotherapy (will receive IV dexamethasone on day of chemotherapy).    diclofenac sodium (VOLTAREN) 2 g, Topical (Top), 4 times daily    ELIQUIS 5 mg, Oral, 2 times daily    flaxseed oil 1,000 mg Cap 1 capsule, Oral, Daily     garlic 1,000 mg Cap 1 tablet, Oral, Daily    ginkgo biloba 60 mg Cap 1 tablet, Oral, Daily    GUM DNWASP-BXOAML-VHSW-ALCOHOL MISC 500 mg, Oral, Daily    HONEY ORAL 1 spray, Oral, Daily    ivermectin (STROMECTOL) 60 mg, Oral, Once    L.acid,gas,plan,rhm/B.ani/cran (UP4 PROBIOTICS WOMEN'S ORAL) 1 tablet, Oral, Daily    letrozole (FEMARA) 2.5 mg, Oral, Daily    LICORICE ROOT EXTRACT, BULK, MISC 1 tablet, Oral, Daily    magnesium 200 mg Tab 1 tablet, Oral, Daily    MAGNESIUM ORAL 500 mg, Oral, Weekly    metoclopramide HCl (REGLAN) 5 mg, Oral, 3 times daily before meals    multivit-min/folic acid/biotin (HAIR,SKIN AND NAILS,FA-BIOTIN, ORAL) 3,000 mcg, Oral, Daily    mv-min-folic acid-lutein (CENTRUM SILVER) 400-250 mcg Chew 1 tablet, Oral, Daily    mv-mn-vitC-qieLa-Oca-Vzx-hc124 (AIRBORNE, ASCORBATE SODIUM,) 333-1.7 mg Chew 1 tablet, Oral, Daily    niacin 250 mg, Oral, Nightly    omega-3 fatty acids/fish oil (FISH OIL-OMEGA-3 FATTY ACIDS) 300-1,000 mg capsule 1 capsule, Oral, Daily    omeprazole (PRILOSEC) 40 mg, Oral, 2 times daily before meals    ondansetron (ZOFRAN-ODT) 4 mg, Oral, Daily    OZEMPIC 0.25 mg or 0.5 mg (2 mg/3 mL) pen injector START WITH INJECTING 0.25 MG FOR 2 WEEKS THEN INCREASE TO 0.5MG WEEKLY    Panax, American ginsg/B12/royl (GINSENG COMPLEX ORAL) 1 tablet, Oral, Daily    potassium 99 mg Tab 1 tablet, Oral, Daily    prochlorperazine (COMPAZINE) 10 mg, Oral, Every 6 hours PRN    pyridoxine (vitamin B6) (B-6) 50 mg, Oral, Daily    rizatriptan (MAXALT) 10 MG tablet Oral, Daily PRN    royal jelly-bee pollen-ginseng -250 mg Cap 1 tablet, Oral, Daily    sodium chloride for inhalation (SODIUM CHLORIDE 0.9%) 0.9 % nebulizer solution SMARTSI Vial(s) Via Nebulizer PRN    soy isofla/blk cohosh/mag bark (ESTROVEN ORAL) 1 tablet, Oral, Daily    soy-blk cohosh-green tea-yerba (ESTROVEN ENERGY) 56- mg Tab 1 tablet, Oral, Daily    valACYclovir (VALTREX) 1,000 mg, Oral, 3 times daily    VALERIAN  ROOT ORAL 880 mg, Oral, Daily    vitamin E 400 Units, Oral, Daily    zinc gluconate 50 mg, Oral, Daily        Past Surgical History:   Procedure Laterality Date    BILATERAL MASTECTOMY      TOTAL ABDOMINAL HYSTERECTOMY W/ BILATERAL SALPINGOOPHORECTOMY      partial                   Physical Exam   There were no vitals taken for this visit.   Wt Readings from Last 3 Encounters:   10/12/23 91.2 kg (201 lb 1 oz)   10/03/23 90.7 kg (200 lb)   10/03/23 90.7 kg (200 lb)     Temp Readings from Last 3 Encounters:   10/12/23 98.1 °F (36.7 °C) (Temporal)   10/03/23 97.5 °F (36.4 °C) (Temporal)   09/08/23 99 °F (37.2 °C) (Oral)     BP Readings from Last 3 Encounters:   10/12/23 (!) 151/77   10/03/23 (!) 153/98   10/02/23 (!) 165/98     Pulse Readings from Last 3 Encounters:   10/12/23 65   10/03/23 61   10/02/23 61      Body mass index is 34.5.    Prior Vitals reviewed.     Physical Exam Deferred for Virtual Visit      Review of Systems:   Cardiac:           No SOB, chest pain with exertion,edema, orthopnea  Distress:          No excessive sadness, no hopelessness, no anhedonia, no excessive worry or nervousness  Cognitive:        No trouble with memory, no difficulty paying attention, no brain fog, no trouble functioning with work or home life  Fatigue:           Energy level adequate, performing ADL's, no morning fatigue                           Fatigue  1  / 10  ( Scale 0 - 10)   Hormonal:       No hot flashes, no night sweats  Pain:                Has  pain,  location:uses castor oil on lumps to shrink them                          Neuropathy:    No numbness, no tingling, no paresthesia   Sleep:              No difficulty falling asleep, no waking up in night, no daytime sleepiness, no snoring  Altered function:          No problems with money management,  no problems with daily organization & planning- currently in law suit over her home. She wants to move but cannot until law suit is settled  Weight:           No  "concerns with weight, wants to lose a little and maintain healthy        Labs:   Lab Results   Component Value Date    WBC 8.6 06/12/2020    HGB 13.2 06/12/2020    HCT 39.5 06/12/2020    MCV 87.2 06/12/2020     06/12/2020           No results found for: "HGBA1C"   T3,T4,T7 and TSH   Vitamin d level  Vitamin b-12       Assessment:   Recurrent Metastatic Breast Cancer  Anxiety       Plan   Nutrition: Continue to encourage plant based diet;  discussed anti-inflammatory diet as well as foods to decrease effects of diabetes and prediabetes. Counseled her that she is not getting enough protein in the liquid diet she describes  Sleep: Recommend 6-8 hours of restful sleep nightly; recommend strong sleep hygiene routine one hour prior to bed. Discussed relaxation,  and guided imagery prior to bed.   Mind Body Medicine: Continue Deep breathing exercise, yoga, and focus on gratitude   Exercise: Recommend 60 minutes of gentle movement/light activity per day (cleaning, walking, cooking, gardening, stationary bike). Recommend some type of cardiovascular activity daily if well.   Recommend a positive thought process through affirmations and visualizations.  Refer to Acupuncture  can consider if she is closer to [facility]    Dietitian for  discussion of her diet- concern regarding lack of protein with no solid food in 2 months except some salmon .  Psychology- she met with psych but does not want to again.   Recommended Vitamins/Supplements:   Counseled her at length regarding supplements.  Counseled her that if she is start chemo she should stop all supplements.  Counseled her that supplements and diet are not a replacement for cancer care.  A healthy diet and some supplements can be used in conjunction with cancer care.  No supplements recommended during chemotherapy.  Counseled her that some of her supplements could actually cause harm to her.  Curcumin is a blood thinner.  [place] Gonda and Lavender are estrogenic. " Manuka honey is excess can cause elevation in blood sugar and can interfere with medications including chemotherapy  Counseled her regarding her untreated atrial fibrillation.  Counseled her that with cancer she is more risk of blood clots and atrial fibrillation puts her at risk of this as well.  Cardiology did recommend some form of anticoagulation.  She does not want to take any anticoagulants   Mushrooms are not recommended when patient is on immunotherapy such as Perjeta and Trastuzumab  Follow-Up: prn once she decides on treatment    Total time 60 minutes- face to face, review of medical record and follow up note sent to patient on supplements

## 2023-11-04 ENCOUNTER — PATIENT MESSAGE (OUTPATIENT)
Dept: HEMATOLOGY/ONCOLOGY | Facility: CLINIC | Age: 58
End: 2023-11-04
Payer: COMMERCIAL

## 2023-11-06 ENCOUNTER — PATIENT MESSAGE (OUTPATIENT)
Dept: HEMATOLOGY/ONCOLOGY | Facility: CLINIC | Age: 58
End: 2023-11-06
Payer: COMMERCIAL

## 2023-11-16 ENCOUNTER — PATIENT MESSAGE (OUTPATIENT)
Dept: HEMATOLOGY/ONCOLOGY | Facility: CLINIC | Age: 58
End: 2023-11-16
Payer: COMMERCIAL

## 2023-11-17 DIAGNOSIS — C50.411 MALIGNANT NEOPLASM OF UPPER-OUTER QUADRANT OF RIGHT BREAST IN FEMALE, ESTROGEN RECEPTOR POSITIVE: Primary | ICD-10-CM

## 2023-11-17 DIAGNOSIS — Z17.0 MALIGNANT NEOPLASM OF UPPER-OUTER QUADRANT OF RIGHT BREAST IN FEMALE, ESTROGEN RECEPTOR POSITIVE: Primary | ICD-10-CM

## 2023-11-17 RX ORDER — ANASTROZOLE 1 MG/1
1 TABLET ORAL DAILY
Qty: 30 TABLET | Refills: 11 | Status: SHIPPED | OUTPATIENT
Start: 2023-11-17 | End: 2024-11-16

## 2023-11-29 ENCOUNTER — TELEPHONE (OUTPATIENT)
Dept: HEMATOLOGY/ONCOLOGY | Facility: CLINIC | Age: 58
End: 2023-11-29
Payer: COMMERCIAL

## 2023-11-29 DIAGNOSIS — Z17.0 MALIGNANT NEOPLASM OF UPPER-OUTER QUADRANT OF RIGHT BREAST IN FEMALE, ESTROGEN RECEPTOR POSITIVE: Primary | ICD-10-CM

## 2023-11-29 DIAGNOSIS — C50.411 MALIGNANT NEOPLASM OF UPPER-OUTER QUADRANT OF RIGHT BREAST IN FEMALE, ESTROGEN RECEPTOR POSITIVE: Primary | ICD-10-CM

## 2023-11-29 NOTE — TELEPHONE ENCOUNTER
"----- Message from Cristine Guerrero MD sent at 11/29/2023 11:55 AM CST -----  How do I send in hospice orders to a 3rd party?   ----- Message -----  From: Martin Arteaga RN  Sent: 11/29/2023  11:22 AM CST  To: Cristine Guerrero MD    Please advise   ----- Message -----  From: Greg Conrad  Sent: 11/29/2023  11:21 AM CST  To: Yolanda Colunga Staff    Consult/Advisory:          Name Of Caller:  [facility] ([facility])      Contact Preference?: [phone]      Fax  [phone]      Provider Name: Yolanda      Does patient feel the need to be seen today? No      What is the nature of the call?: Requesting hospice and H&P order before 4 pm today          Additional Notes:  "Thank you for all that you do for our patients"            "

## 2023-12-31 NOTE — PROGRESS NOTES
Service Date:  10/3/23    Chief Complaint: Breast Cancer (Breast CA with Mets)    Sasha Hensley is a 58 y.o. female here with newly diagnosed metastatic breast cancer. Patient would like to do treatments closer to home so was referred to our clinic in [place].    Onc History from Dr Guerrero's note:    Patient initially diagnosed with left breast cancer in [place], [place] in 2018  Initially had a palpable lump  Diagnostic MMG 3/21/18: bilobed mass with calcs lateral breast 60mm and pleomorphic calcs [id number]  U/S: two spiculated masses 37mm in total (one [id number], other [id number]), -axilla     Bx 3/26/18: IDC, grade 2, ER 96%/PR90%/HER2 equivocal; Ki67 8%    CT C/A/P and bone scan 4/10/18 negative    Bilateral mastectomy with Dr. Wallis 4/19/18:  -Left: fibroadenoma  -Right:IDC, grade 3, 36mm; 0/3 LN+   -FISH: HER2 4.88, CEP17 3.15, equivocal (category 4)  [id number]     Oncotype high intermediate (26), declined chemotherapy (tested prior to results of TAILORx study).   Recommended for ovarian suppression and AI therapy. Patient declined. Documents from primary oncologist in [place] are available in Care Everywhere      She was considering [facility]GRIS flap reconstruction in July 2023 which prompted CT imaging for vascular review. CT demonstrated pulmonary nodules     PET 8/8/23: 2cm perihilar nodule within the DEEP with hypermetabolic uptake with an SUV of 10.9. A 1.5cm nodule within the left lower lobe is also hypermetabolic with an SUV of 8.1.  Non-mass like hypermetabolic uptake within the thyroid gland and the proximal stomach which are most likely benign.      FNA/Lymph node aspirate 8/16/23: positive for malignant cells, consistent with metastatic poorly differentiated breast carcinoma  LN, station 11L, biopsy: 1/1 lymph node positive for metastatic carcinoma      Receptors:   ER %  N[place] %  HER2 3+  Ki67 15%    Review of Systems     Current Outpatient Medications   Medication Instructions    albuterol (PROVENTIL) 2.5  mg, Nebulization, Every 6 hours PRN    alfalfa 433 mg, Oral, Daily    ALPRAZolam (XANAX) 1 mg, Oral    amLODIPine (NORVASC) 5 mg, Oral    anastrozole (ARIMIDEX) 1 mg, Oral, Daily    arginine 500 mg, Oral, Daily    carvediloL (COREG) 6.25 mg, Oral, 2 times daily    cholecalciferol (vitamin D3) 400 Units, Oral, Daily    cyanocobalamin 500 mcg, Oral, Daily    dexAMETHasone (DECADRON) 8 mg, Oral, 2 times daily, Take 2 tablets (8 mg) by mouth twice daily on the day before chemotherapy and the day after chemotherapy (will receive IV dexamethasone on day of chemotherapy).    diclofenac sodium (VOLTAREN) 2 g, Topical (Top), 4 times daily    ELIQUIS 5 mg, Oral, 2 times daily    flaxseed oil 1,000 mg Cap 1 capsule, Oral, Daily    garlic 1,000 mg Cap 1 tablet, Oral, Daily    ginkgo biloba 60 mg Cap 1 tablet, Oral, Daily    GUM ONCAXM-FSEBPJ-FBWZ-ALCOHOL MISC 500 mg, Oral, Daily    HONEY ORAL 1 spray, Oral, Daily    ivermectin (STROMECTOL) 60 mg, Oral, Once    L.acid,gas,plan,rhm/B.ani/cran (UP4 PROBIOTICS WOMEN'S ORAL) 1 tablet, Oral, Daily    LICORICE ROOT EXTRACT, BULK, MISC 1 tablet, Oral, Daily    magnesium 200 mg Tab 1 tablet, Oral, Daily    MAGNESIUM ORAL 500 mg, Oral, Weekly    metoclopramide HCl (REGLAN) 5 mg, Oral, 3 times daily before meals    multivit-min/folic acid/biotin (HAIR,SKIN AND NAILS,FA-BIOTIN, ORAL) 3,000 mcg, Oral, Daily    mv-min-folic acid-lutein (CENTRUM SILVER) 400-250 mcg Chew 1 tablet, Oral, Daily    mv-mn-vitC-dncNe-Lvt-Dcs-hc124 ([place], ASCORBATE SODIUM,) 333-1.7 mg Chew 1 tablet, Oral, Daily    niacin 250 mg, Oral, Nightly    omega-3 fatty acids/fish oil (FISH OIL-OMEGA-3 FATTY ACIDS) 300-1,000 mg capsule 1 capsule, Oral, Daily    omeprazole (PRILOSEC) 40 mg, Oral, 2 times daily before meals    ondansetron (ZOFRAN-ODT) 4 mg, Oral, Daily    OZEMPIC 0.25 mg or 0.5 mg (2 mg/3 mL) pen injector START WITH INJECTING 0.25 MG FOR 2 WEEKS THEN INCREASE TO 0.5MG WEEKLY    Panax, American ginsamanda/B12/alvarez  (GINSENG COMPLEX ORAL) 1 tablet, Oral, Daily    potassium 99 mg Tab 1 tablet, Oral, Daily    prochlorperazine (COMPAZINE) 10 mg, Oral, Every 6 hours PRN    pyridoxine (vitamin B6) (B-6) 50 mg, Oral, Daily    rizatriptan (MAXALT) 10 MG tablet Oral, Daily PRN    royal jelly-bee pollen-ginseng -250 mg Cap 1 tablet, Oral, Daily    sodium chloride for inhalation (SODIUM CHLORIDE 0.9%) 0.9 % nebulizer solution SMARTSI Vial(s) Via Nebulizer PRN    soy isofla/blk cohosh/mag bark (ESTROVEN ORAL) 1 tablet, Oral, Daily    soy-blk cohosh-green tea-yerba (ESTROVEN ENERGY) 56- mg Tab 1 tablet, Oral, Daily    valACYclovir (VALTREX) 1,000 mg, Oral, 3 times daily    VALERIAN ROOT ORAL 880 mg, Oral, Daily    vitamin E 400 Units, Oral, Daily    zinc gluconate 50 mg, Oral, Daily        Past Medical History:   Diagnosis Date    Anxiety     Aphasia due to closed TBI (traumatic brain injury)     Arthritis     Asthma     Bladder prolapse, female, acquired     Breast cancer     Cerebrovascular small vessel disease     HTN (hypertension)     IBS (irritable bowel syndrome)     Migraines     Mouth ulcers     Multiple sclerosis     Neoplasm of adipose tissue     Osteoporosis     Sciatica     Scoliosis         Past Surgical History:   Procedure Laterality Date    BILATERAL MASTECTOMY      TOTAL ABDOMINAL HYSTERECTOMY W/ BILATERAL SALPINGOOPHORECTOMY      partial         Family History   Problem Relation Age of Onset    Multiple sclerosis Mother     Parkinsonism Mother     Multiple sclerosis Maternal Grandmother     Multiple sclerosis Maternal Aunt     Parkinsonism Father     Heart disease Father     Heart disease Son        Social History     Tobacco Use    Smoking status: Never    Smokeless tobacco: Never   Substance Use Topics    Alcohol use: Not Currently    Drug use: Never         Vitals:    10/03/23 1314   BP: (!) 153/98   Pulse: 61   Resp: 18   Temp: 97.5 °F (36.4 °C)        Physical Exam:  BP (!) 153/98 (BP Location: Right  "arm, Patient Position: Sitting, BP Method: Medium (Automatic))   Pulse 61   Temp 97.5 °F (36.4 °C) (Temporal)   Resp 18   Ht 5' 4" (1.626 m)   Wt 90.7 kg (200 lb)   SpO2 99%   BMI 34.33 kg/m²     Physical Exam     Labs:  Lab Results   Component Value Date    WBC 8.6 06/12/2020    RBC 4.53 06/12/2020    HGB 13.2 06/12/2020    HCT 39.5 06/12/2020    MCV 87.2 06/12/2020    MCH 29.1 06/12/2020    MCHC 33.4 06/12/2020    RDW 43.4 06/12/2020     06/12/2020    MPV 9.4 06/12/2020    MONO 6.40 06/12/2020    MONO 0.55 06/12/2020    BASO 0.04 06/12/2020    EOSINOPHIL 0.50 (L) 06/12/2020    EOSINOPHIL 0.04 (L) 06/12/2020    BASOPHIL 0.50 06/12/2020     Sodium   Date Value Ref Range Status   09/30/2021 140 136 - 147 mmol/L Final     Potassium   Date Value Ref Range Status   09/30/2021 4.1 3.5 - 5.1 mmol/L Final     Chloride   Date Value Ref Range Status   09/30/2021 107 98 - 107 mmol/L Final     CO2   Date Value Ref Range Status   09/30/2021 26 20 - 31 mmol/L Final     BUN   Date Value Ref Range Status   09/30/2021 8 (L) 9 - 23 mg/dL Final     Creatinine   Date Value Ref Range Status   09/30/2021 0.67 0.55 - 1.02 mg/dL Final     Calcium   Date Value Ref Range Status   09/30/2021 10.2 8.3 - 10.6 mg/dL Final     Albumin Level   Date Value Ref Range Status   02/12/2020 4.9 (H) 3.2 - 4.8 g/dL Final     Alk Phos   Date Value Ref Range Status   02/12/2020 64 46 - 116 IU/L Final     Aspartate Aminotransferase   Date Value Ref Range Status   02/12/2020 26 <34 IU/L Final     Alanine Aminotransferase   Date Value Ref Range Status   02/12/2020 19 10 - 49 IU/L Final     eGFR    Date Value Ref Range Status   09/30/2021 >90 >90 mL/min/1.73m2 Final     Comment:     CKD-EPI GFR INTERPRETATION GUIDELINES  Estimated using CKD-EPI equation based on standardized serum creatinine, age, and sex    DESCRIPTION               eGFR mL/min/1.73m2  Mild Decrease                           60 - 89  Moderate Decrease              "          30 - 59  Severe Decrease                         15 - 29  Kidney Failure                          <15 (or dialysis)     eGFR   Date Value Ref Range Status   09/30/2021 >90 >90 mL/min/1.73m2 Final       A/P:    Metastatic breast cancer  -plan is to treat with THP  -patient is willing to do treatment and I will set her up with chemo school here.  -stressed importance of starting on treatment, although it will be palliative in nature  -RTC when ready to start if willing to do treatment here.      Aurash Khoobehi, MD  Hematology and Oncology

## 2024-01-22 ENCOUNTER — PATIENT MESSAGE (OUTPATIENT)
Dept: PSYCHIATRY | Facility: CLINIC | Age: 59
End: 2024-01-22
Payer: COMMERCIAL

## 2024-02-21 ENCOUNTER — TELEPHONE (OUTPATIENT)
Dept: HEMATOLOGY/ONCOLOGY | Facility: CLINIC | Age: 59
End: 2024-02-21
Payer: COMMERCIAL

## 2024-05-02 ENCOUNTER — PATIENT MESSAGE (OUTPATIENT)
Dept: PSYCHIATRY | Facility: CLINIC | Age: 59
End: 2024-05-02
Payer: COMMERCIAL

## 2024-07-25 ENCOUNTER — PATIENT MESSAGE (OUTPATIENT)
Dept: PSYCHIATRY | Facility: CLINIC | Age: 59
End: 2024-07-25
Payer: COMMERCIAL

## 2024-08-05 ENCOUNTER — PATIENT MESSAGE (OUTPATIENT)
Dept: PSYCHIATRY | Facility: CLINIC | Age: 59
End: 2024-08-05
Payer: COMMERCIAL

## 2024-11-17 DIAGNOSIS — Z17.0 MALIGNANT NEOPLASM OF UPPER-OUTER QUADRANT OF RIGHT BREAST IN FEMALE, ESTROGEN RECEPTOR POSITIVE: ICD-10-CM

## 2024-11-17 DIAGNOSIS — C50.411 MALIGNANT NEOPLASM OF UPPER-OUTER QUADRANT OF RIGHT BREAST IN FEMALE, ESTROGEN RECEPTOR POSITIVE: ICD-10-CM

## 2024-11-18 RX ORDER — ANASTROZOLE 1 MG/1
1 TABLET ORAL
Qty: 30 TABLET | Refills: 11 | Status: SHIPPED | OUTPATIENT
Start: 2024-11-18

## 2024-12-16 ENCOUNTER — PATIENT MESSAGE (OUTPATIENT)
Dept: PSYCHIATRY | Facility: CLINIC | Age: 59
End: 2024-12-16
Payer: COMMERCIAL